# Patient Record
Sex: FEMALE | Race: WHITE | NOT HISPANIC OR LATINO | Employment: FULL TIME | URBAN - NONMETROPOLITAN AREA
[De-identification: names, ages, dates, MRNs, and addresses within clinical notes are randomized per-mention and may not be internally consistent; named-entity substitution may affect disease eponyms.]

---

## 2024-03-12 ENCOUNTER — APPOINTMENT (OUTPATIENT)
Dept: CARDIOLOGY | Facility: HOSPITAL | Age: 49
End: 2024-03-12

## 2024-03-12 ENCOUNTER — HOSPITAL ENCOUNTER (EMERGENCY)
Facility: HOSPITAL | Age: 49
Discharge: OTHER INSTITUTION | End: 2024-03-14
Attending: EMERGENCY MEDICINE

## 2024-03-12 DIAGNOSIS — F32.1 CURRENT MODERATE EPISODE OF MAJOR DEPRESSIVE DISORDER, UNSPECIFIED WHETHER RECURRENT (MULTI): ICD-10-CM

## 2024-03-12 DIAGNOSIS — F22 PARANOID BEHAVIOR (MULTI): Primary | ICD-10-CM

## 2024-03-12 LAB
ALBUMIN SERPL BCP-MCNC: 4.6 G/DL (ref 3.4–5)
ALP SERPL-CCNC: 62 U/L (ref 33–110)
ALT SERPL W P-5'-P-CCNC: 17 U/L (ref 7–45)
AMPHETAMINES UR QL SCN: NORMAL
ANION GAP SERPL CALC-SCNC: 14 MMOL/L (ref 10–20)
APAP SERPL-MCNC: <10 UG/ML
AST SERPL W P-5'-P-CCNC: 21 U/L (ref 9–39)
BARBITURATES UR QL SCN: NORMAL
BASOPHILS # BLD AUTO: 0.08 X10*3/UL (ref 0–0.1)
BASOPHILS NFR BLD AUTO: 0.7 %
BENZODIAZ UR QL SCN: NORMAL
BILIRUB SERPL-MCNC: 0.8 MG/DL (ref 0–1.2)
BUN SERPL-MCNC: 20 MG/DL (ref 6–23)
BZE UR QL SCN: NORMAL
CALCIUM SERPL-MCNC: 9.6 MG/DL (ref 8.6–10.3)
CANNABINOIDS UR QL SCN: NORMAL
CHLORIDE SERPL-SCNC: 103 MMOL/L (ref 98–107)
CO2 SERPL-SCNC: 25 MMOL/L (ref 21–32)
CREAT SERPL-MCNC: 0.8 MG/DL (ref 0.5–1.05)
EGFRCR SERPLBLD CKD-EPI 2021: 90 ML/MIN/1.73M*2
EOSINOPHIL # BLD AUTO: 0.01 X10*3/UL (ref 0–0.7)
EOSINOPHIL NFR BLD AUTO: 0.1 %
ERYTHROCYTE [DISTWIDTH] IN BLOOD BY AUTOMATED COUNT: 12.2 % (ref 11.5–14.5)
ETHANOL SERPL-MCNC: <10 MG/DL
FENTANYL+NORFENTANYL UR QL SCN: NORMAL
GLUCOSE SERPL-MCNC: 147 MG/DL (ref 74–99)
HCT VFR BLD AUTO: 45.4 % (ref 36–46)
HGB BLD-MCNC: 15.1 G/DL (ref 12–16)
HOLD SPECIMEN: NORMAL
IMM GRANULOCYTES # BLD AUTO: 0.03 X10*3/UL (ref 0–0.7)
IMM GRANULOCYTES NFR BLD AUTO: 0.3 % (ref 0–0.9)
LYMPHOCYTES # BLD AUTO: 1.37 X10*3/UL (ref 1.2–4.8)
LYMPHOCYTES NFR BLD AUTO: 11.8 %
MAGNESIUM SERPL-MCNC: 1.96 MG/DL (ref 1.6–2.4)
MCH RBC QN AUTO: 30.8 PG (ref 26–34)
MCHC RBC AUTO-ENTMCNC: 33.3 G/DL (ref 32–36)
MCV RBC AUTO: 93 FL (ref 80–100)
METHADONE UR QL SCN: NORMAL
MONOCYTES # BLD AUTO: 0.94 X10*3/UL (ref 0.1–1)
MONOCYTES NFR BLD AUTO: 8.1 %
NEUTROPHILS # BLD AUTO: 9.15 X10*3/UL (ref 1.2–7.7)
NEUTROPHILS NFR BLD AUTO: 79 %
NRBC BLD-RTO: 0 /100 WBCS (ref 0–0)
OPIATES UR QL SCN: NORMAL
OXYCODONE+OXYMORPHONE UR QL SCN: NORMAL
PCP UR QL SCN: NORMAL
PLATELET # BLD AUTO: 269 X10*3/UL (ref 150–450)
POTASSIUM SERPL-SCNC: 3.6 MMOL/L (ref 3.5–5.3)
PROT SERPL-MCNC: 7.9 G/DL (ref 6.4–8.2)
RBC # BLD AUTO: 4.9 X10*6/UL (ref 4–5.2)
SALICYLATES SERPL-MCNC: <3 MG/DL
SODIUM SERPL-SCNC: 138 MMOL/L (ref 136–145)
TSH SERPL-ACNC: 0.93 MIU/L (ref 0.44–3.98)
WBC # BLD AUTO: 11.6 X10*3/UL (ref 4.4–11.3)

## 2024-03-12 PROCEDURE — 84443 ASSAY THYROID STIM HORMONE: CPT | Performed by: EMERGENCY MEDICINE

## 2024-03-12 PROCEDURE — 80307 DRUG TEST PRSMV CHEM ANLYZR: CPT | Performed by: EMERGENCY MEDICINE

## 2024-03-12 PROCEDURE — 2500000002 HC RX 250 W HCPCS SELF ADMINISTERED DRUGS (ALT 637 FOR MEDICARE OP, ALT 636 FOR OP/ED): Performed by: EMERGENCY MEDICINE

## 2024-03-12 PROCEDURE — 83735 ASSAY OF MAGNESIUM: CPT | Performed by: EMERGENCY MEDICINE

## 2024-03-12 PROCEDURE — 80179 DRUG ASSAY SALICYLATE: CPT | Performed by: EMERGENCY MEDICINE

## 2024-03-12 PROCEDURE — 36415 COLL VENOUS BLD VENIPUNCTURE: CPT | Performed by: EMERGENCY MEDICINE

## 2024-03-12 PROCEDURE — 93005 ELECTROCARDIOGRAM TRACING: CPT

## 2024-03-12 PROCEDURE — 82077 ASSAY SPEC XCP UR&BREATH IA: CPT | Performed by: EMERGENCY MEDICINE

## 2024-03-12 PROCEDURE — 80143 DRUG ASSAY ACETAMINOPHEN: CPT | Performed by: EMERGENCY MEDICINE

## 2024-03-12 PROCEDURE — 85025 COMPLETE CBC W/AUTO DIFF WBC: CPT | Performed by: EMERGENCY MEDICINE

## 2024-03-12 PROCEDURE — 99285 EMERGENCY DEPT VISIT HI MDM: CPT | Mod: 25

## 2024-03-12 PROCEDURE — 80053 COMPREHEN METABOLIC PANEL: CPT | Performed by: EMERGENCY MEDICINE

## 2024-03-12 RX ORDER — QUETIAPINE FUMARATE 25 MG/1
25 TABLET, FILM COATED ORAL ONCE
Status: COMPLETED | OUTPATIENT
Start: 2024-03-12 | End: 2024-03-12

## 2024-03-12 RX ADMIN — QUETIAPINE FUMARATE 25 MG: 25 TABLET ORAL at 23:18

## 2024-03-12 SDOH — HEALTH STABILITY: MENTAL HEALTH: BEHAVIORS/MOOD: DELUSIONS

## 2024-03-12 SDOH — HEALTH STABILITY: MENTAL HEALTH: BEHAVIORS/MOOD: ANXIOUS

## 2024-03-12 SDOH — SOCIAL STABILITY: SOCIAL INSECURITY: FAMILY BEHAVIORS: ANXIOUS

## 2024-03-12 ASSESSMENT — COLUMBIA-SUICIDE SEVERITY RATING SCALE - C-SSRS
1. IN THE PAST MONTH, HAVE YOU WISHED YOU WERE DEAD OR WISHED YOU COULD GO TO SLEEP AND NOT WAKE UP?: NO
2. HAVE YOU ACTUALLY HAD ANY THOUGHTS OF KILLING YOURSELF?: NO
6. HAVE YOU EVER DONE ANYTHING, STARTED TO DO ANYTHING, OR PREPARED TO DO ANYTHING TO END YOUR LIFE?: NO

## 2024-03-12 ASSESSMENT — PAIN SCALES - GENERAL: PAINLEVEL_OUTOF10: 0 - NO PAIN

## 2024-03-12 ASSESSMENT — PAIN - FUNCTIONAL ASSESSMENT: PAIN_FUNCTIONAL_ASSESSMENT: 0-10

## 2024-03-12 NOTE — SIGNIFICANT EVENT
Application for Emergency Admission      Ready for Transfer?  Is the patient medically cleared for transfer to inpatient psychiatry: No  Has the patient been accepted to an inpatient psychiatric hospital: No    Application for Emergency Admission  IN ACCORDANCE WITH [place] 5122.10 [place]  The Chief Clinical Officer of: [facility] 3/12/2024 .7:15 PM    Reason for Hospitalization  The undersigned has reason to believe that: Robert Momin Is a mentally ill person subject to hospitalization by court order under division B Section 5122.01 of the Revised Code, i.e., this person:    1.No  Represents a substantial risk of physical harm to self as manifested by evidence of threats of, or attempts at, suicide or serious self-inflicted bodily harm    2.No Represents a substantial risk of physical harm to others as manifested by evidence of recent homicidal or other violent behavior, evidence of recent threats that place another in reasonable fear of violent behavior and serious physical harm, or other evidence of present dangerousness    3.Yes Represents a substantial and immediate risk of serious physical impairment or injury to self as manifested by  evidence that the person is unable to provide for and is not providing for the person's basic physical needs because of the person's mental illness and that appropriate provision for those needs cannot be made  immediately available in the community    4.Yes Would benefit from treatment in a hospital for his mental illness and is in need of such treatment as manifested by evidence of behavior that creates a grave and imminent risk to substantial rights of others or  himself.    5.Yes Would benefit from treatment as manifested by evidence of behavior that indicates all of the following:       (a) The person is unlikely to survive safely in the community without supervision, based on a clinical determination.       (b) The person has a history of lack of compliance with treatment  for mental illness and one of the following applies:      (i) At least twice within the thirty-six months prior to the filing of an affidavit seeking court-ordered treatment of the person under section 5122.111 of the Revised Code, the lack of compliance has been a significant factor in necessitating hospitalization in a hospital or receipt of services in a forensic or other mental health unit of a correctional facility, provided that the thirty-six-month period shall be extended by the length of any hospitalization or incarceration of the person that occurred within the thirty-six-month period.      (ii) Within the forty-eight months prior to the filing of an affidavit seeking court-ordered treatment of the person under section 5122.111 of the Revised Code, the lack of compliance resulted in one or more acts of serious violent behavior toward self or others or threats of, or attempts at, serious physical harm to self or others, provided that the forty-eight-month period shall be extended by the length of any hospitalization or incarceration of the person that occurred within the forty-eight-month period.      (c) The person, as a result of mental illness, is unlikely to voluntarily participate in necessary treatment.       (d) In view of the person's treatment history and current behavior, the person is in need of treatment in order to prevent a relapse or deterioration that would be likely to result in substantial risk of serious harm to the person or others.    (e) Represents a substantial risk of physical harm to self or others if allowed to remain at liberty pending examination.    Therefore, it is requested that said person be admitted to the above named facility.    STATEMENT OF BELIEF    Must be filled out by one of the following: a psychiatrist, licensed physician, licensed clinical psychologist, health or ,  or .  (Statement shall include the circumstances under which the  individual was taken into custody and the reason for the person's belief that hospitalization is necessary. The statement shall also include a reference to efforts made to secure the individual's property at his residence if he was taken into custody there. Every reasonable and appropriate effort should be made to take this person into custody in the least conspicuous manner possible.)    Toby Ryan MD 3/12/2024     _____________________________________________________________   Place of Employment: [employer]    STATEMENT OF OBSERVATION BY PSYCHIATRIST, LICENSED PHYSICIAN, OR LICENSED CLINICAL PSYCHOLOGIST, IF APPLICABLE    Place of Observation (e.g., [facility] mental health center, general hospital, office, emergency facility)  (If applicable, please complete)    Toby Ryan MD 3/12/2024    _____________________________________________________________

## 2024-03-12 NOTE — SIGNIFICANT EVENT
Application for Emergency Admission      Ready for Transfer?  Is the patient medically cleared for transfer to inpatient psychiatry: No  Has the patient been accepted to an inpatient psychiatric hospital: No    Application for Emergency Admission  IN ACCORDANCE WITH [place] 5122.10 [place]  The Chief Clinical Officer of: 3/12/2024 .7:16 PM    Reason for Hospitalization  The undersigned has reason to believe that: Robert Momin Is a mentally ill person subject to hospitalization by court order under division B Section 5122.01 of the Revised Code, i.e., this person:    1.No  Represents a substantial risk of physical harm to self as manifested by evidence of threats of, or attempts at, suicide or serious self-inflicted bodily harm    2.No Represents a substantial risk of physical harm to others as manifested by evidence of recent homicidal or other violent behavior, evidence of recent threats that place another in reasonable fear of violent behavior and serious physical harm, or other evidence of present dangerousness    3.Yes Represents a substantial and immediate risk of serious physical impairment or injury to self as manifested by  evidence that the person is unable to provide for and is not providing for the person's basic physical needs because of the person's mental illness and that appropriate provision for those needs cannot be made  immediately available in the community    4.Yes Would benefit from treatment in a hospital for his mental illness and is in need of such treatment as manifested by evidence of behavior that creates a grave and imminent risk to substantial rights of others or  himself.    5.Yes Would benefit from treatment as manifested by evidence of behavior that indicates all of the following:       (a) The person is unlikely to survive safely in the community without supervision, based on a clinical determination.       (b) The person has a history of lack of compliance with treatment for  mental illness and one of the following applies:      (i) At least twice within the thirty-six months prior to the filing of an affidavit seeking court-ordered treatment of the person under section 5122.111 of the Revised Code, the lack of compliance has been a significant factor in necessitating hospitalization in a hospital or receipt of services in a forensic or other mental health unit of a correctional facility, provided that the thirty-six-month period shall be extended by the length of any hospitalization or incarceration of the person that occurred within the thirty-six-month period.      (ii) Within the forty-eight months prior to the filing of an affidavit seeking court-ordered treatment of the person under section 5122.111 of the Revised Code, the lack of compliance resulted in one or more acts of serious violent behavior toward self or others or threats of, or attempts at, serious physical harm to self or others, provided that the forty-eight-month period shall be extended by the length of any hospitalization or incarceration of the person that occurred within the forty-eight-month period.      (c) The person, as a result of mental illness, is unlikely to voluntarily participate in necessary treatment.       (d) In view of the person's treatment history and current behavior, the person is in need of treatment in order to prevent a relapse or deterioration that would be likely to result in substantial risk of serious harm to the person or others.    (e) Represents a substantial risk of physical harm to self or others if allowed to remain at liberty pending examination.    Therefore, it is requested that said person be admitted to the above named facility.    STATEMENT OF BELIEF    Must be filled out by one of the following: a psychiatrist, licensed physician, licensed clinical psychologist, health or ,  or .  (Statement shall include the circumstances under which the  individual was taken into custody and the reason for the person's belief that hospitalization is necessary. The statement shall also include a reference to efforts made to secure the individual's property at his residence if he was taken into custody there. Every reasonable and appropriate effort should be made to take this person into custody in the least conspicuous manner possible.)      Toby Ryan MD 3/12/2024     ___________________________[facility]__________________________________   Place of Employment: [facility]    STATEMENT OF OBSERVATION BY PSYCHIATRIST, LICENSED PHYSICIAN, OR LICENSED CLINICAL PSYCHOLOGIST, IF APPLICABLE    Place of Observation (e.g., [facility] mental health center, general hospital, office, emergency facility)  (If applicable, please complete)    Toby Ryan MD 3/12/2024    _____________________________________________________________

## 2024-03-12 NOTE — ED PROVIDER NOTES
HPI   Chief Complaint   Patient presents with    Psychiatric Evaluation     Pt denies SI/HI, states she is having dreams that consume her, dreams of her ex-wife coming back to be with her. Pt states this would be a good thing, but is very tearful and states she is more depressed lately due to recent death in the family       Chief complaint the patient states she has been hearing voices and feeling very paranoid  The patient also states she is depressed  History of present illness this patient states that she feels as if something is drastically wrong with her.  The patient was seen at [facility] last night and again this morning because she wants to be admitted to a healthcare facility for psychiatric stabilization.  This patient lives with her son and the son's girlfriend.  The patient states that she has been extremely depressed and has been hearing voices and having delusions.  The patient states that she thinks the cell phone is creating waves that create movements of people around her that influence how the patient is behaving.  Patient also states that there are cameras checking her from many angles including the cameras on the phones.  The patient also feels as if someone is using her eyeballs to control her movements and thoughts.  She states she is very paranoid and has been having hallucinations hearing the voices of her ex-girlfriend quite frequently.  None of the voices are threatening.  The patient states she is not suicidal or homicidal but she has been accusing her son of trying to hurt her and manipulate her.  The son says the patient is not eating or drinking she has been very agitated wandering through the house saying unusual things that do not make any sense and feels like this patient is very unstable psychiatrically.  This patient states that she has been in a prior relationship with her girlfriend who has had communication via the cell phone and other forms of communication with her and this is  upsetting to the patient however the patient admits that she wants this girlfriend to come back and be back in relationship with her.  The patient does have a history of depression she had been on Zoloft in the past she has not been diagnosed with bipolar disorder or schizophrenia she does have a strong family history of psychiatric problems including bipolar disorder.  The patient states she is hypertensive but not taking any medications.    physical exam:    General: Vitals noted, chills, tearful rapid speech pressured speech admits to hallucinations and paranoid ideation not suicidal or homicidal distress. Afebrile. Alert and oriented  x 4 .  Pupils equal and reactive bilaterally    EENT: TMs clear. Posterior oropharynx unremarkable. No meningismus. No LAD.     Cardiac: Regular, rate, rhythm, no murmurs rubs or gallops.     Pulmonary: Lungs clear bilaterally with good aeration. No adventitious breath sounds. No wheezes rales or rhonchi.     Abdomen: Soft, nonsurgical. Nontender. No peritoneal signs. Normoactive bowel sounds. No pulsatile masses.     Extremities: No peripheral edema. Negative Homans bilaterally, no cords.    Skin: No rash. Intact.     Neuro: No focal neurologic deficits, NIH score of 0. Cranial nerves normal as tested from II through XII.                               Disha Coma Scale Score: 15                     Patient History   Past Medical History:   Diagnosis Date    Depression      History reviewed. No pertinent surgical history.  No family history on file.  Social History     Tobacco Use    Smoking status: Never    Smokeless tobacco: Never   Vaping Use    Vaping Use: Never used   Substance Use Topics    Alcohol use: Not Currently    Drug use: Never       Physical Exam   ED Triage Vitals [03/12/24 1808]   Temp Heart Rate Respirations BP   -- 95 20 (!) 146/91      SpO2 Temp src Heart Rate Source Patient Position   95 % -- -- --      BP Location FiO2 (%)     -- --       Physical Exam    ED  Course & MDM   ED Course as of 03/13/24 1456   Tue Mar 12, 2024   1904 Drug Screen, Urine  Urine drug screen negative [AG]   1907 Signing this patient over to Dr Monsalve [AG]   1937 CBC and Auto Differential(!)  CBC normal [AG]   1937 Urine drug screen negative [AG]   1937 Magnesium  Magnesium  normal [AG]   1937 acetaminophen negligible [AG]   1938 Comprehensive metabolic profile glucose 147 otherwise unremarkable [AG]   1938 Salicylate level  Salicylate negligible [AG]   1938 TSH with reflex to Free T4 if abnormal  Thyroid studies normal [AG]   1938 [facility] will be activated I am signing this patient over to the care of Dr. Monsalve [AG]   2001 Received report from Dr Ryan [MN]   2222 EPIC secure communication form Dr Esquivel of [facility]. Advised to start quetiapine. Patient meets criteria for inpatient treatment [MN]   2250 Patient assessed, chart reviewed. Agreeable to inpatient care [MN]      ED Course User Index  [AG] Toby Ryan MD  [MN] Isis Monsalve MD         Diagnoses as of 03/13/24 1456   Paranoid behavior (CMS/HCC)   Current moderate episode of major depressive disorder, unspecified whether recurrent (CMS/HCC)       Medical Decision Making      Procedure  Procedures     Toby Ryan MD  03/12/24 1955       Toby Ryan MD  03/13/24 1456

## 2024-03-13 PROBLEM — F23 ACUTE PSYCHOSIS (MULTI): Status: ACTIVE | Noted: 2024-03-13

## 2024-03-13 LAB
ATRIAL RATE: 83 BPM
FLUAV RNA RESP QL NAA+PROBE: NOT DETECTED
FLUBV RNA RESP QL NAA+PROBE: NOT DETECTED
HCG UR QL IA.RAPID: NEGATIVE
P AXIS: 58 DEGREES
P OFFSET: 202 MS
P ONSET: 150 MS
PR INTERVAL: 142 MS
Q ONSET: 221 MS
QRS COUNT: 14 BEATS
QRS DURATION: 82 MS
QT INTERVAL: 360 MS
QTC CALCULATION(BAZETT): 423 MS
QTC FREDERICIA: 401 MS
R AXIS: -6 DEGREES
SARS-COV-2 RNA RESP QL NAA+PROBE: NOT DETECTED
T AXIS: 12 DEGREES
T OFFSET: 401 MS
VENTRICULAR RATE: 83 BPM

## 2024-03-13 PROCEDURE — 81025 URINE PREGNANCY TEST: CPT | Performed by: EMERGENCY MEDICINE

## 2024-03-13 PROCEDURE — 2500000002 HC RX 250 W HCPCS SELF ADMINISTERED DRUGS (ALT 637 FOR MEDICARE OP, ALT 636 FOR OP/ED): Performed by: EMERGENCY MEDICINE

## 2024-03-13 PROCEDURE — 87636 SARSCOV2 & INF A&B AMP PRB: CPT | Performed by: EMERGENCY MEDICINE

## 2024-03-13 RX ORDER — QUETIAPINE FUMARATE 25 MG/1
25 TABLET, FILM COATED ORAL NIGHTLY
Status: DISCONTINUED | OUTPATIENT
Start: 2024-03-13 | End: 2024-03-14 | Stop reason: HOSPADM

## 2024-03-13 RX ADMIN — QUETIAPINE FUMARATE 25 MG: 25 TABLET ORAL at 21:03

## 2024-03-13 NOTE — CONSULTS
BEHAVIORAL HEALTH INITIAL CONSULTATION    Referring Provider: Dr. Monsalve    Visit type: Virtual evaluation; consent for telepsychiatric evaluation was obtained from pt    HISTORY OF PRESENT ILLNESS:  Robert Momin is a 48 y.o. female, hx of panic attacks, presenting to ED for worsening delusions and psychosis in the past week.    Per ED provider note:  History of present illness this patient states that she feels as if something is drastically wrong with her. The patient was seen at [facility] last night and again this morning because she wants to be admitted to a healthcare facility for psychiatric stabilization. This patient lives with her son and the son's girlfriend. The patient states that she has been extremely depressed and has been hearing voices and having delusions. The patient states that she thinks the cell phone is creating waves that create movements of people around her that influence how the patient is behaving. Patient also states that there are cameras checking her from many angles including the cameras on the phones. The patient also feels as if someone is using her eyeballs to control her movements and thoughts. She states she is very paranoid and has been having hallucinations hearing the voices of her ex-girlfriend quite frequently. None of the voices are threatening. The patient states she is not suicidal or homicidal but she has been accusing her son of trying to hurt her and manipulate her. The son says the patient is not eating or drinking she has been very agitated wandering through the house saying unusual things that do not make any sense and feels like this patient is very unstable psychiatrically. This patient states that she has been in a prior relationship with her girlfriend who has had communication via the cell phone and other forms of communication with her and this is upsetting to the patient however the patient admits that she wants this girlfriend to come back and be back in  "relationship with her. The patient does have a history of depression she had been on Zoloft in the past she has not been diagnosed with bipolar disorder or schizophrenia she does have a strong family history of psychiatric problems including bipolar disorder.     On interview with pt, she reports she is here today because she has been \"bottling\" emotions up and today reaching a breaking point. Ex-gf \"is my addiction\", and broke up with pt in July 2020; since then pt intermittently develops hope of getting back together again and in those moments feel, \"consumed\" and \"delusional\", for example hearing her voice while knowing she is not there as ex lives in [place]. Recent trigger was pt reaching back out to ex-gf in July 2023. Pt endorses feeling that there are cameras in her home which are controlling her movements. Reporting average 6 hours of sleep in the last 3 nights, barely eating, racing thoughts, auditory hallucinations, increased impulsivity; denies high-risk behaviors or rapid speech. Denies any suicidal or homicidal ideations.     Collateral from son: Son reports pt has not been herself in the last several days (5-6 days ago)  with paranoid delusions (cameras in the house, fam hiding something from pt, \"you guys think you're slick but you're not really slick\"), calling son by ex-wife's name last night. Son reports this is the first time he has witnessed pt experiencing these sxs.     Past Psychiatric History  Current/Previous Diagnoses: Panic d/o, \"Rage\" d/o  Current Psychiatrist/Provider: Denies  Outpatient Treatment History: Well over 30 years since last saw  provider  Current Medications: Denies  Past Medication Trials: Sertraline  Inpatient Hospitalizations: Denies  Suicide Attempts: One in 5th grade pt took some pills and fell asleep  Homicide attempts/Violence: Denies  Self Harm/Self Injurious: Denies  Hx trauma/abuse: Denies    Family Psych History  Father - schizophrenia  Sister - bipolar " d/o  Son - bipolar d/o  Niece - bipolar d/o    Substance Abuse History  She reports that she has never smoked. She has never used smokeless tobacco. She reports that she does not currently use alcohol. She reports that she does not use drugs.  Tobacco use history: Former 1 pack every 3 days; quit 10 years ago  Alcohol use history: Once a month if even   Cannabis use history: Denies  Illicit Drug Use History: Denies  Hx of rehab/complicated withdrawal: Denies  Caffeine: At least 1 cup/day sometimes up to 5 cups/day     Social History  Household: Lives at home with son and son's gf  Occupation: Work at [employer]    Social supports: Son and his gf, older sister, younger sister, and best friend  Hobbies/interests/coping: Coloring, word search/puzzles, write poetry  Legal hx: Denies  Weapons at home and access to lethal means: Denies  Guardian/POA/Payee: Self    OARRS REVIEW  OARRS checked: No data recorded   OARRS comments: Rev 3/12/24 no data    Past Medical/Surgical History  HTN  High cholesterol  Stroke in 2004 w/ residual deficits L-side weakness  GERD  Pre-DM  PCOS  Refer to primary note for more comprehensive details.    ALLERGIES  Patient has no known allergies.    PSYCHIATRIC REVIEW OF SYSTEMS  Depression: depressed mood, sleep disturbance: difficulty falling asleep and frequent awakening, tearfulness, and changes in appetite or weight leading to significant weight loss or gain unintentionally   Anxiety: excessive worry that is difficult to control, restlessness or feeling keyed up or on edge, irritability, sleep disturbance, and panic attacks: episodes of palpitations, tachycardia, chest pain/tightness, trembling/shaking, shortness of breath , and fear of dying or going crazy  Jeanne: expansive or irritable mood , increased goal-directed activity , inflated self-esteem or grandiosity , decreased need for sleep, talkativeness or pressured speech, flight of ideas or racing thoughts, and increased  "distractibility  Psychosis: auditory hallucinations:ex gf voice positive content, paranoia, and delusions: persecutory, grandiosity, and erotomania or love  Delirium: negative   Trauma: negative    OBJECTIVE    VITALS      3/12/2024     6:08 PM   Vitals   Systolic 146   Diastolic 91   Heart Rate 95   Resp 20   Height (in) 1.702 m (5' 7\")   Weight (lb) 180   BMI 28.19 kg/m2   BSA (m2) 1.96 m2      Body mass index is 28.19 kg/m².  Facility age limit for growth %holly is 20 years.  Wt Readings from Last 4 Encounters:   03/12/24 81.6 kg (180 lb)     Mental Status Exam  General: Tearful, distressed 48yoF seated in ED bed  Appearance: Appeared as age stated; dyed purple hair, glasses, slightly disheveled  Attitude: Anxious/mildly agitated but cooperative  Behavior: Fair EC; overall responding appropriately  Motor Activity: No notable mauri PMAR  Speech: Moments of more rapid speech but overall not pressured, coherent, normal volume  Mood: \"Out of it\"  Affect: Labile mood  Thought Process: Circumstantial  Thought Content: Denies SI/HI  Thought Perception: Endorses paranoia, delusions, and auditory hallucinations; denies visual hallucinations; not responding to internal stimuli  Cognition: Intact grossly  Insight: Fair  Judgement: Impaired    HOME MEDICATIONS  Medication Documentation Review Audit    **Prior to Admission medications have not yet been reviewed**          CURRENT MEDICATIONS  Scheduled medications    Continuous medications    PRN medications    LABS  Admission on 03/12/2024   Component Date Value Ref Range Status    WBC 03/12/2024 11.6 (H)  4.4 - 11.3 x10*3/uL Final    nRBC 03/12/2024 0.0  0.0 - 0.0 /100 WBCs Final    RBC 03/12/2024 4.90  4.00 - 5.20 x10*6/uL Final    Hemoglobin 03/12/2024 15.1  12.0 - 16.0 g/dL Final    Hematocrit 03/12/2024 45.4  36.0 - 46.0 % Final    MCV 03/12/2024 93  80 - 100 fL Final    MCH 03/12/2024 30.8  26.0 - 34.0 pg Final    MCHC 03/12/2024 33.3  32.0 - 36.0 g/dL Final    RDW " 03/12/2024 12.2  11.5 - 14.5 % Final    Platelets 03/12/2024 269  150 - 450 x10*3/uL Final    Neutrophils % 03/12/2024 79.0  40.0 - 80.0 % Final    Immature Granulocytes %, Automated 03/12/2024 0.3  0.0 - 0.9 % Final    Immature Granulocyte Count (IG) includes promyelocytes, myelocytes and metamyelocytes but does not include bands. Percent differential counts (%) should be interpreted in the context of the absolute cell counts (cells/UL).    Lymphocytes % 03/12/2024 11.8  13.0 - 44.0 % Final    Monocytes % 03/12/2024 8.1  2.0 - 10.0 % Final    Eosinophils % 03/12/2024 0.1  0.0 - 6.0 % Final    Basophils % 03/12/2024 0.7  0.0 - 2.0 % Final    Neutrophils Absolute 03/12/2024 9.15 (H)  1.20 - 7.70 x10*3/uL Final    Percent differential counts (%) should be interpreted in the context of the absolute cell counts (cells/uL).    Immature Granulocytes Absolute, Au* 03/12/2024 0.03  0.00 - 0.70 x10*3/uL Final    Lymphocytes Absolute 03/12/2024 1.37  1.20 - 4.80 x10*3/uL Final    Monocytes Absolute 03/12/2024 0.94  0.10 - 1.00 x10*3/uL Final    Eosinophils Absolute 03/12/2024 0.01  0.00 - 0.70 x10*3/uL Final    Basophils Absolute 03/12/2024 0.08  0.00 - 0.10 x10*3/uL Final    Magnesium 03/12/2024 1.96  1.60 - 2.40 mg/dL Final    Glucose 03/12/2024 147 (H)  74 - 99 mg/dL Final    Sodium 03/12/2024 138  136 - 145 mmol/L Final    Potassium 03/12/2024 3.6  3.5 - 5.3 mmol/L Final    Chloride 03/12/2024 103  98 - 107 mmol/L Final    Bicarbonate 03/12/2024 25  21 - 32 mmol/L Final    Anion Gap 03/12/2024 14  10 - 20 mmol/L Final    Urea Nitrogen 03/12/2024 20  6 - 23 mg/dL Final    Creatinine 03/12/2024 0.80  0.50 - 1.05 mg/dL Final    eGFR 03/12/2024 90  >60 mL/min/1.73m*2 Final    Calculations of estimated GFR are performed using the 2021 CKD-EPI Study Refit equation without the race variable for the IDMS-Traceable creatinine methods.  https://jasn.asnjournals.org/content/early/2021/09/22/ASN.8560679214    Calcium 03/12/2024 9.6   8.6 - 10.3 mg/dL Final    Albumin 03/12/2024 4.6  3.4 - 5.0 g/dL Final    Alkaline Phosphatase 03/12/2024 62  33 - 110 U/L Final    Total Protein 03/12/2024 7.9  6.4 - 8.2 g/dL Final    AST 03/12/2024 21  9 - 39 U/L Final    Bilirubin, Total 03/12/2024 0.8  0.0 - 1.2 mg/dL Final    ALT 03/12/2024 17  7 - 45 U/L Final    Patients treated with Sulfasalazine may generate falsely decreased results for ALT.    Amphetamine Screen, Urine 03/12/2024 Presumptive Negative  Presumptive Negative Final    CUTOFF LEVEL: 500 NG/ML   Cross-reactivity has been reported with high concentrations   of the following drugs: buproprion, chloroquine, chlorpromazine,   ephedrine, mephentermine, fenfluramine, phentermine,   phenylpropanolamine, pseudoephedrine, and propranolol.    Barbiturate Screen, Urine 03/12/2024 Presumptive Negative  Presumptive Negative Final    CUTOFF LEVEL: 200 NG/ML    Benzodiazepines Screen, Urine 03/12/2024 Presumptive Negative  Presumptive Negative Final    CUTOFF LEVEL: 200 NG/ML    Cannabinoid Screen, Urine 03/12/2024 Presumptive Negative  Presumptive Negative Final    CUTOFF LEVEL: 50 NG/ML    Cocaine Metabolite Screen, Urine 03/12/2024 Presumptive Negative  Presumptive Negative Final    CUTOFF LEVEL: 150 NG/ML    Fentanyl Screen, Urine 03/12/2024 Presumptive Negative  Presumptive Negative Final    CUTOFF LEVEL: 5 NG/ML    Opiate Screen, Urine 03/12/2024 Presumptive Negative  Presumptive Negative Final    CUTOFF LEVEL: 300 NG/ML  The opiate screen does not detect fentanyl, meperidine, or   tramadol. Oxycodone is not consistently detected (refer to  Oxycodone Screen, Urine result).    Oxycodone Screen, Urine 03/12/2024 Presumptive Negative  Presumptive Negative Final    CUTOFF LEVEL: 100 NG/ML  This test will accurately detect both oxycodone and oxymorphone.    PCP Screen, Urine 03/12/2024 Presumptive Negative  Presumptive Negative Final    CUTOFF LEVEL:  25 NG/ML  Cross-reactivity has been reported with  dextromethorphan.    Methadone Screen, Urine 03/12/2024 Presumptive Negative  Presumptive Negative Final    CUTOFF LEVEL: 150 NG/ML  The metabolite L-alpha-acetylmethadol (LAAM) is not  detected by this method in concentrations that would  be found in the urine of patients on LAAM therapy.    Salicylate  03/12/2024 <3  4 - 20 mg/dL Final    Alcohol 03/12/2024 <10  <=10 mg/dL Final    For medical use only.    Acetaminophen 03/12/2024 <10.0  10.0 - 30.0 ug/mL Final    Thyroid Stimulating Hormone 03/12/2024 0.93  0.44 - 3.98 mIU/L Final    Extra Tube 03/12/2024 Hold for add-ons.   Final    Auto resulted.    Extra Tube 03/12/2024 Hold for add-ons.   Final    Auto resulted.    Extra Tube 03/12/2024 Hold for add-ons.   Final    Auto resulted.     PSYCHIATRIC RISK ASSESSMENT  Violence Risk Factors:  persecutory delusions  Acute Risk of Harm to Others is Considered: Moderate to High  Suicide Risk Factors: ; /[place] native, sexual minority, prior suicide attempts , recent loss or impending loss of loved one, failed relationship the last year, life crisis (shame/despair), severe anxiety, akathisia, or panic, anxious ruminations, and lack of treatment access, discontinuities in treatment, or recent discharge from hospital  Protective Factors: fear of suicide or death, sense of responsibility towards family, social support/connectedness, positive family relationships, hopefulness/future-orientation, and employment  Acute Risk of Harm to Self is Considered: High    ASSESSMENT AND PLAN  Robert Momin is a 48 y.o. female, hx of panic attacks, presenting to ED for worsening delusions and psychosis in the past week. On initial assessment, pt is anxious, with labile mood, endorsing worsening paranoia, delusions, auditory hallucinations, decreased sleep, racing thoughts, increased goal-directed activities, and some grandiosity over the past several days; son corroborated these symptoms on collateral and  "notes this is first time pt has had this presentation. No suicidal ideations. There is family history of schizophrenia in pt's father, and bipolar d/o in several other fam members. CT head was neg for acute findings, Utox also neg, and medical work-up largely unremarkable thus far. Overall, pt's presentation represents dramatic change from baseline c/f unspecified psychosis and would benefit from inpt psychiatric hospitalization for further eval and treatment.   Recommendations detailed below.     EKG - none recently    IMPRESSION  Unspecified psychosis  Panic d/o by hx    RECOMMENDATIONS  - Patient does currently meet criteria for inpatient psychiatric admission. Once patient is deemed medically cleared, please document clearly in note that patient is MEDICALLY CLEARED and contact [facility] for referral at [phone], pager [phone]. Issue Application for Emergency Admission (pink slip) only after patient is accepted to an inpatient psychiatric unit and is ready to be discharged. Search “Application for Emergency Admission” under SmartText.”  - Patient lacks the capacity to leave AMA at this time and thus cannot leave AMA. Call [facility] if patient attempts to leave AMA.  - To evaluate decision-making capacity, recommend use of the Capacity Evaluation Tool. Search “ IP Capacity Evaluation under SmartText\" unless the patient has a legal guardian, in which case all decisions per the legal guardian.  - Patient does require a 1:1 sitter from a psychiatric perspective at this time.  - As with all hospitalized patients, would recommend delirium precautions, as below.   -- Minimize use of benzos, opiates, anticholinergics, as these may worsen mental status   -- Would use caution with narcotic pain medications   -- Would still adequately controlling pain, as uncontrolled pain is also a risk factor for delirium   -- Reinforce sleep hygiene; encourage patient to stay awake during the day   -- Keep curtains/blinds open during the day " and closed at night.   -- Would recommend reorienting/redirecting patient as much as possible,    -- Aim for consistent staffing, familiar objects, avoiding bright lights and loud noises, etc.     Work-up:  - Utox negative, TSH wnl  - CT Head 3/12 no acute findings  - Consider B12, folate, RPR, HIV  - Please obtain EKG for Qtc monitoring    Medications:  - Pending Qtc < 475ms, initiate quetiapine 25mg PO tonight     Follow-up:  - Pt has no current outpatient, but was connected recently to [facility] (not yet had intake appt)  ==========  - Discussed recommendations with ED provider.    Patient staffed/discussed with Dr. Bui, who agrees with above plan.    Tiffanie Esquivel MD  PGY-3 Adult Psychiatry  On behalf of the Adult Psychiatry EPAT Service; tel. [phone]

## 2024-03-13 NOTE — SIGNIFICANT EVENT
Application for Emergency Admission      Ready for Transfer?  Is the patient medically cleared for transfer to inpatient psychiatry: Yes  Has the patient been accepted to an inpatient psychiatric hospital: Yes    Application for Emergency Admission  IN ACCORDANCE WITH [place] 5122[place]10 [place]  The Chief Clinical Officer of:  Dr. Ewing 3/13/2024 .3:44 PM    Reason for Hospitalization  The undersigned has reason to believe that: Robert Momin Is a mentally ill person subject to hospitalization by court order under division B Section 5122.01 of the Revised Code, i.e., this person:    1.Yes  Represents a substantial risk of physical harm to self as manifested by evidence of threats of, or attempts at, suicide or serious self-inflicted bodily harm    2.No Represents a substantial risk of physical harm to others as manifested by evidence of recent homicidal or other violent behavior, evidence of recent threats that place another in reasonable fear of violent behavior and serious physical harm, or other evidence of present dangerousness    3.Yes Represents a substantial and immediate risk of serious physical impairment or injury to self as manifested by  evidence that the person is unable to provide for and is not providing for the person's basic physical needs because of the person's mental illness and that appropriate provision for those needs cannot be made  immediately available in the community    4.Yes Would benefit from treatment in a hospital for his mental illness and is in need of such treatment as manifested by evidence of behavior that creates a grave and imminent risk to substantial rights of others or  himself.    5.Yes Would benefit from treatment as manifested by evidence of behavior that indicates all of the following:       (a) The person is unlikely to survive safely in the community without supervision, based on a clinical determination.       (b) The person has a history of lack of compliance with  treatment for mental illness and one of the following applies:      (i) At least twice within the thirty-six months prior to the filing of an affidavit seeking court-ordered treatment of the person under section 5122.111 of the Revised Code, the lack of compliance has been a significant factor in necessitating hospitalization in a hospital or receipt of services in a forensic or other mental health unit of a correctional facility, provided that the thirty-six-month period shall be extended by the length of any hospitalization or incarceration of the person that occurred within the thirty-six-month period.      (ii) Within the forty-eight months prior to the filing of an affidavit seeking court-ordered treatment of the person under section 5122.111 of the Revised Code, the lack of compliance resulted in one or more acts of serious violent behavior toward self or others or threats of, or attempts at, serious physical harm to self or others, provided that the forty-eight-month period shall be extended by the length of any hospitalization or incarceration of the person that occurred within the forty-eight-month period.      (c) The person, as a result of mental illness, is unlikely to voluntarily participate in necessary treatment.       (d) In view of the person's treatment history and current behavior, the person is in need of treatment in order to prevent a relapse or deterioration that would be likely to result in substantial risk of serious harm to the person or others.    (e) Represents a substantial risk of physical harm to self or others if allowed to remain at liberty pending examination.    Therefore, it is requested that said person be admitted to the above named facility.    STATEMENT OF BELIEF    Must be filled out by one of the following: a psychiatrist, licensed physician, licensed clinical psychologist, health or ,  or .  (Statement shall include the circumstances under  which the individual was taken into custody and the reason for the person's belief that hospitalization is necessary. The statement shall also include a reference to efforts made to secure the individual's property at his residence if he was taken into custody there. Every reasonable and appropriate effort should be made to take this person into custody in the least conspicuous manner possible.)         Sania Kaur MD 3/13/2024   ER attending physician, [facility]  _____________________________________________________________   Place of Employment:     STATEMENT OF OBSERVATION BY PSYCHIATRIST, LICENSED PHYSICIAN, OR LICENSED CLINICAL PSYCHOLOGIST, IF APPLICABLE    Place of Observation (e.g., [facility], general hospital, office, emergency facility)  (If applicable, please complete)    Sania Kaur MD 3/13/2024    _____________________________________________________________

## 2024-03-13 NOTE — PROGRESS NOTES
Robert Momin is a 48 y.o. female on day 0 of admission presenting with depression and delusional thinking. She was medically cleared by Dr Ryan. Please see his not for full details of the H&P. Patient was signed out to me pending EPAT consultation.    Subjective   2250 --Denies any needs at this time. Eating fast food with family       Objective     Physical Exam  Vitals reviewed.   Constitutional:       Appearance: Normal appearance.   HENT:      Head: Normocephalic.   Cardiovascular:      Rate and Rhythm: Normal rate.   Pulmonary:      Effort: Pulmonary effort is normal.   Skin:     General: Skin is dry.   Neurological:      General: No focal deficit present.      Mental Status: She is alert.   Psychiatric:         Mood and Affect: Mood normal.         Behavior: Behavior normal.     EK --twelve-lead EKG was obtained and read by me. This demonstrates normal sinus rhythm with a rate of 83, normal intervals, normal axes, no ectopy, no ischemia, no pericarditis. There was no  prior EKG.    Labs Reviewed   CBC WITH AUTO DIFFERENTIAL - Abnormal       Result Value    WBC 11.6 (*)     nRBC 0.0      RBC 4.90      Hemoglobin 15.1      Hematocrit 45.4      MCV 93      MCH 30.8      MCHC 33.3      RDW 12.2      Platelets 269      Neutrophils % 79.0      Immature Granulocytes %, Automated 0.3      Lymphocytes % 11.8      Monocytes % 8.1      Eosinophils % 0.1      Basophils % 0.7      Neutrophils Absolute 9.15 (*)     Immature Granulocytes Absolute, Automated 0.03      Lymphocytes Absolute 1.37      Monocytes Absolute 0.94      Eosinophils Absolute 0.01      Basophils Absolute 0.08     COMPREHENSIVE METABOLIC PANEL - Abnormal    Glucose 147 (*)     Sodium 138      Potassium 3.6      Chloride 103      Bicarbonate 25      Anion Gap 14      Urea Nitrogen 20      Creatinine 0.80      eGFR 90      Calcium 9.6      Albumin 4.6      Alkaline Phosphatase 62      Total Protein 7.9      AST 21      Bilirubin, Total 0.8       ALT 17     MAGNESIUM - Normal    Magnesium 1.96     DRUG SCREEN,URINE - Normal    Amphetamine Screen, Urine Presumptive Negative      Barbiturate Screen, Urine Presumptive Negative      Benzodiazepines Screen, Urine Presumptive Negative      Cannabinoid Screen, Urine Presumptive Negative      Cocaine Metabolite Screen, Urine Presumptive Negative      Fentanyl Screen, Urine Presumptive Negative      Opiate Screen, Urine Presumptive Negative      Oxycodone Screen, Urine Presumptive Negative      PCP Screen, Urine Presumptive Negative      Methadone Screen, Urine Presumptive Negative      Narrative:     Drug screen results are presumptive and should not be used to assess   compliance with prescribed medication. Contact the performing [facility] laboratory   to add-on definitive confirmatory testing if clinically indicated.    Toxicology screening results are reported qualitatively. The concentration must   be greater than or equal to the cutoff to be reported as positive. The concentration   at which the screening test can detect an individual drug or metabolite varies.   The absence of expected drug(s) and/or drug metabolite(s) may indicate non-compliance,   inappropriate timing of specimen collection relative to drug administration, poor drug   absorption, diluted/adulterated urine, or limitations of testing. For medical purposes   only; not valid for forensic use.    Interpretive questions should be directed to the laboratory medical directors.   SALICYLATE - Normal    Salicylate  <3     ALCOHOL - Normal    Alcohol <10     ACETAMINOPHEN - Normal    Acetaminophen <10.0     TSH WITH REFLEX TO FREE T4 IF ABNORMAL - Normal    Thyroid Stimulating Hormone 0.93      Narrative:     TSH testing is performed using different testing methodology at [facility] than at other [facility]. Direct result comparisons should only be made within the same method.     GRAY TOP    Extra Tube Hold for add-ons.       ED  "Medication Administration from 03/12/2024 1756 to 03/12/2024 0953         Date/Time Order Dose Route Action Action by     03/12/2024 5149 EDT QUEtiapine (SEROquel) tablet 25 mg 25 mg oral Given Rice, C              Last Recorded Vitals  Blood pressure (!) 146/91, pulse 95, resp. rate 20, height 1.702 m (5' 7\"), weight 81.6 kg (180 lb), SpO2 95 %.  Intake/Output last 3 Shifts:  No intake/output data recorded.    Relevant Results                 Assessment/Plan   Active Problems:    Current moderate episode of major depressive disorder (CMS/HCC)    This patient presented to the emergency department with feelings of depression and some delusional thinking.  No suicidal or homicidal ideation.  No evidence of acute life-threatening medical illness or injury that would prohibit appropriate psychiatric care.    Patient was signed out to me pending EPAT consultation.  Patient was seen via telemetry consult with Dr. Esquivel of the EPAT.  She advises that the patient does meet inpatient criteria for treatment.  She recommends starting quetiapine tonight, as long as no Qtc prolongation on EKG. Qtc was 423 here; therefore, fist dose ordered.    Patient is agreeable with the plan for inpatient treatment and to start the medications tonight.  We will continue the patient under safe supervision pending appropriate facility placement and transportation.       I spent 0 minutes in the critical care of this patient.    Diagnosis    Current moderate episode of major depressive disorder (CMS/HCC)    Isis Monsalve MD      "

## 2024-03-13 NOTE — PROGRESS NOTES
This patient has been seen by multiple providers and signed out to me and subsequently has been accepted by EPAT for admission to [facility] psychiatric unit by Dr. Ewing.  She has been hemodynamically stable with no chest pain or short of breath required no intervention during my.  Of her care in the ER infection eating require any care except for continuation of medication and her meals.  She was cooperative and remained clinically stable subsequently and being transferred to [facility] psychiatric unit as    .  Patient agreed with the transfer.                 Assessment/Plan   Active Problems:    Current moderate episode of major depressive disorder (CMS/HCC)           I spent 5 minutes in the professional and overall care of this patient.      Sania Kaur MD

## 2024-03-14 ENCOUNTER — HOSPITAL ENCOUNTER (INPATIENT)
Facility: HOSPITAL | Age: 49
LOS: 8 days | Discharge: HOME | End: 2024-03-22
Attending: PSYCHIATRY & NEUROLOGY | Admitting: PSYCHIATRY & NEUROLOGY

## 2024-03-14 VITALS
WEIGHT: 180 LBS | OXYGEN SATURATION: 95 % | SYSTOLIC BLOOD PRESSURE: 148 MMHG | RESPIRATION RATE: 20 BRPM | TEMPERATURE: 98.6 F | BODY MASS INDEX: 28.25 KG/M2 | HEART RATE: 78 BPM | HEIGHT: 67 IN | DIASTOLIC BLOOD PRESSURE: 93 MMHG

## 2024-03-14 DIAGNOSIS — F31.2 BIPOLAR AFFECTIVE DISORDER, CURRENTLY MANIC, SEVERE, WITH PSYCHOTIC FEATURES (MULTI): Primary | ICD-10-CM

## 2024-03-14 DIAGNOSIS — F41.8 OTHER SPECIFIED ANXIETY DISORDERS: ICD-10-CM

## 2024-03-14 PROCEDURE — 1240000001 HC SEMI-PRIVATE BH ROOM DAILY

## 2024-03-14 PROCEDURE — 97150 GROUP THERAPEUTIC PROCEDURES: CPT | Mod: GO,CO

## 2024-03-14 PROCEDURE — 2500000002 HC RX 250 W HCPCS SELF ADMINISTERED DRUGS (ALT 637 FOR MEDICARE OP, ALT 636 FOR OP/ED): Performed by: PSYCHIATRY & NEUROLOGY

## 2024-03-14 PROCEDURE — 97165 OT EVAL LOW COMPLEX 30 MIN: CPT | Mod: GO

## 2024-03-14 PROCEDURE — 99223 1ST HOSP IP/OBS HIGH 75: CPT | Performed by: PSYCHIATRY & NEUROLOGY

## 2024-03-14 RX ORDER — LORAZEPAM 2 MG/ML
2 INJECTION INTRAMUSCULAR EVERY 6 HOURS PRN
Status: DISCONTINUED | OUTPATIENT
Start: 2024-03-14 | End: 2024-03-22 | Stop reason: HOSPADM

## 2024-03-14 RX ORDER — HYDROXYZINE PAMOATE 50 MG/1
50 CAPSULE ORAL EVERY 4 HOURS PRN
Status: DISCONTINUED | OUTPATIENT
Start: 2024-03-14 | End: 2024-03-22 | Stop reason: HOSPADM

## 2024-03-14 RX ORDER — DIPHENHYDRAMINE HYDROCHLORIDE 50 MG/ML
50 INJECTION INTRAMUSCULAR; INTRAVENOUS ONCE AS NEEDED
Status: DISCONTINUED | OUTPATIENT
Start: 2024-03-14 | End: 2024-03-22 | Stop reason: HOSPADM

## 2024-03-14 RX ORDER — ACETAMINOPHEN 500 MG
5 TABLET ORAL NIGHTLY PRN
Status: DISCONTINUED | OUTPATIENT
Start: 2024-03-14 | End: 2024-03-22 | Stop reason: HOSPADM

## 2024-03-14 RX ORDER — DIPHENHYDRAMINE HCL 50 MG
50 CAPSULE ORAL EVERY 6 HOURS PRN
Status: DISCONTINUED | OUTPATIENT
Start: 2024-03-14 | End: 2024-03-22 | Stop reason: HOSPADM

## 2024-03-14 RX ORDER — ALUMINUM HYDROXIDE, MAGNESIUM HYDROXIDE, AND SIMETHICONE 1200; 120; 1200 MG/30ML; MG/30ML; MG/30ML
30 SUSPENSION ORAL EVERY 6 HOURS PRN
Status: DISCONTINUED | OUTPATIENT
Start: 2024-03-14 | End: 2024-03-22 | Stop reason: HOSPADM

## 2024-03-14 RX ORDER — HALOPERIDOL 5 MG/ML
10 INJECTION INTRAMUSCULAR EVERY 6 HOURS PRN
Status: DISCONTINUED | OUTPATIENT
Start: 2024-03-14 | End: 2024-03-22 | Stop reason: HOSPADM

## 2024-03-14 RX ORDER — LORAZEPAM 1 MG/1
2 TABLET ORAL EVERY 6 HOURS PRN
Status: DISCONTINUED | OUTPATIENT
Start: 2024-03-14 | End: 2024-03-22 | Stop reason: HOSPADM

## 2024-03-14 RX ORDER — ACETAMINOPHEN 325 MG/1
650 TABLET ORAL EVERY 4 HOURS PRN
Status: DISCONTINUED | OUTPATIENT
Start: 2024-03-14 | End: 2024-03-22 | Stop reason: HOSPADM

## 2024-03-14 RX ORDER — HALOPERIDOL 5 MG/1
10 TABLET ORAL EVERY 6 HOURS PRN
Status: DISCONTINUED | OUTPATIENT
Start: 2024-03-14 | End: 2024-03-22 | Stop reason: HOSPADM

## 2024-03-14 RX ADMIN — QUETIAPINE FUMARATE 75 MG: 50 TABLET ORAL at 20:58

## 2024-03-14 SDOH — HEALTH STABILITY: PHYSICAL HEALTH: ON AVERAGE, HOW MANY MINUTES DO YOU ENGAGE IN EXERCISE AT THIS LEVEL?: 0 MIN

## 2024-03-14 SDOH — SOCIAL STABILITY: SOCIAL INSECURITY: ABUSE: ADULT

## 2024-03-14 SDOH — ECONOMIC STABILITY: FOOD INSECURITY: WITHIN THE PAST 12 MONTHS, THE FOOD YOU BOUGHT JUST DIDN'T LAST AND YOU DIDN'T HAVE MONEY TO GET MORE.: SOMETIMES TRUE

## 2024-03-14 SDOH — SOCIAL STABILITY: SOCIAL INSECURITY: WERE YOU ABLE TO COMPLETE ALL THE BEHAVIORAL HEALTH SCREENINGS?: YES

## 2024-03-14 SDOH — ECONOMIC STABILITY: INCOME INSECURITY: IN THE PAST 12 MONTHS, HAS THE ELECTRIC, GAS, OIL, OR WATER COMPANY THREATENED TO SHUT OFF SERVICE IN YOUR HOME?: NO

## 2024-03-14 SDOH — HEALTH STABILITY: MENTAL HEALTH
EXPERIENCED ANY OF THE FOLLOWING LIFE EVENTS: SOCIAL LOSS (BANKRUPTCY, DIVORCE, WORK-RELATED STRESS);PHYSICAL ASSAULT;DEATH OF FAMILY/FRIEND

## 2024-03-14 SDOH — SOCIAL STABILITY: SOCIAL INSECURITY
WITHIN THE LAST YEAR, HAVE TO BEEN RAPED OR FORCED TO HAVE ANY KIND OF SEXUAL ACTIVITY BY YOUR PARTNER OR EX-PARTNER?: NO

## 2024-03-14 SDOH — ECONOMIC STABILITY: HOUSING INSECURITY: IN THE LAST 12 MONTHS, HOW MANY PLACES HAVE YOU LIVED?: 2

## 2024-03-14 SDOH — ECONOMIC STABILITY: INCOME INSECURITY: IN THE LAST 12 MONTHS, WAS THERE A TIME WHEN YOU WERE NOT ABLE TO PAY THE MORTGAGE OR RENT ON TIME?: YES

## 2024-03-14 SDOH — SOCIAL STABILITY: SOCIAL INSECURITY: DO YOU FEEL UNSAFE GOING BACK TO THE PLACE WHERE YOU ARE LIVING?: NO

## 2024-03-14 SDOH — SOCIAL STABILITY: SOCIAL INSECURITY: WITHIN THE LAST YEAR, HAVE YOU BEEN AFRAID OF YOUR PARTNER OR EX-PARTNER?: NO

## 2024-03-14 SDOH — SOCIAL STABILITY: SOCIAL NETWORK
DO YOU BELONG TO ANY CLUBS OR ORGANIZATIONS SUCH AS CHURCH GROUPS UNIONS, FRATERNAL OR ATHLETIC GROUPS, OR SCHOOL GROUPS?: NO

## 2024-03-14 SDOH — ECONOMIC STABILITY: INCOME INSECURITY: HOW HARD IS IT FOR YOU TO PAY FOR THE VERY BASICS LIKE FOOD, HOUSING, MEDICAL CARE, AND HEATING?: VERY HARD

## 2024-03-14 SDOH — ECONOMIC STABILITY: HOUSING INSECURITY
IN THE LAST 12 MONTHS, WAS THERE A TIME WHEN YOU DID NOT HAVE A STEADY PLACE TO SLEEP OR SLEPT IN A SHELTER (INCLUDING NOW)?: NO

## 2024-03-14 SDOH — SOCIAL STABILITY: SOCIAL INSECURITY: DOES ANYONE TRY TO KEEP YOU FROM HAVING/CONTACTING OTHER FRIENDS OR DOING THINGS OUTSIDE YOUR HOME?: NO

## 2024-03-14 SDOH — ECONOMIC STABILITY: TRANSPORTATION INSECURITY
IN THE PAST 12 MONTHS, HAS THE LACK OF TRANSPORTATION KEPT YOU FROM MEDICAL APPOINTMENTS OR FROM GETTING MEDICATIONS?: YES

## 2024-03-14 SDOH — SOCIAL STABILITY: SOCIAL INSECURITY: WITHIN THE LAST YEAR, HAVE YOU BEEN HUMILIATED OR EMOTIONALLY ABUSED IN OTHER WAYS BY YOUR PARTNER OR EX-PARTNER?: NO

## 2024-03-14 SDOH — SOCIAL STABILITY: SOCIAL INSECURITY: ARE YOU OR HAVE YOU BEEN THREATENED OR ABUSED PHYSICALLY, EMOTIONALLY, OR SEXUALLY BY ANYONE?: YES

## 2024-03-14 SDOH — SOCIAL STABILITY: SOCIAL INSECURITY: HAS ANYONE EVER THREATENED TO HURT YOUR FAMILY OR YOUR PETS?: NO

## 2024-03-14 SDOH — ECONOMIC STABILITY: FOOD INSECURITY: WITHIN THE PAST 12 MONTHS, YOU WORRIED THAT YOUR FOOD WOULD RUN OUT BEFORE YOU GOT MONEY TO BUY MORE.: SOMETIMES TRUE

## 2024-03-14 SDOH — SOCIAL STABILITY: SOCIAL NETWORK: ARE YOU MARRIED, WIDOWED, DIVORCED, SEPARATED, NEVER MARRIED, OR LIVING WITH A PARTNER?: SEPARATED

## 2024-03-14 SDOH — HEALTH STABILITY: MENTAL HEALTH
STRESS IS WHEN SOMEONE FEELS TENSE, NERVOUS, ANXIOUS, OR CAN'T SLEEP AT NIGHT BECAUSE THEIR MIND IS TROUBLED. HOW STRESSED ARE YOU?: TO SOME EXTENT

## 2024-03-14 SDOH — SOCIAL STABILITY: SOCIAL INSECURITY: HAVE YOU HAD THOUGHTS OF HARMING ANYONE ELSE?: NO

## 2024-03-14 SDOH — SOCIAL STABILITY: SOCIAL INSECURITY: ARE THERE ANY APPARENT SIGNS OF INJURIES/BEHAVIORS THAT COULD BE RELATED TO ABUSE/NEGLECT?: NO

## 2024-03-14 SDOH — SOCIAL STABILITY: SOCIAL NETWORK: HOW OFTEN DO YOU ATTEND CHURCH OR RELIGIOUS SERVICES?: PATIENT DECLINED

## 2024-03-14 SDOH — SOCIAL STABILITY: SOCIAL NETWORK: VISITOR BEHAVIORS: CALM

## 2024-03-14 SDOH — HEALTH STABILITY: PHYSICAL HEALTH: ON AVERAGE, HOW MANY DAYS PER WEEK DO YOU ENGAGE IN MODERATE TO STRENUOUS EXERCISE (LIKE A BRISK WALK)?: 0 DAYS

## 2024-03-14 SDOH — SOCIAL STABILITY: SOCIAL NETWORK: HOW OFTEN DO YOU GET TOGETHER WITH FRIENDS OR RELATIVES?: ONCE A WEEK

## 2024-03-14 SDOH — SOCIAL STABILITY: SOCIAL INSECURITY: ARE YOU OR HAVE YOU BEEN THREATENED OR ABUSED PHYSICALLY, EMOTIONALLY, OR SEXUALLY BY ANYONE?: NO

## 2024-03-14 SDOH — SOCIAL STABILITY: SOCIAL NETWORK: HOW OFTEN DO YOU ATTENT MEETINGS OF THE CLUB OR ORGANIZATION YOU BELONG TO?: NEVER

## 2024-03-14 SDOH — SOCIAL STABILITY: SOCIAL INSECURITY: DO YOU FEEL ANYONE HAS EXPLOITED OR TAKEN ADVANTAGE OF YOU FINANCIALLY OR OF YOUR PERSONAL PROPERTY?: NO

## 2024-03-14 SDOH — SOCIAL STABILITY: SOCIAL NETWORK: IN A TYPICAL WEEK, HOW MANY TIMES DO YOU TALK ON THE PHONE WITH FAMILY, FRIENDS, OR NEIGHBORS?: THREE TIMES A WEEK

## 2024-03-14 SDOH — ECONOMIC STABILITY: TRANSPORTATION INSECURITY
IN THE PAST 12 MONTHS, HAS LACK OF TRANSPORTATION KEPT YOU FROM MEETINGS, WORK, OR FROM GETTING THINGS NEEDED FOR DAILY LIVING?: YES

## 2024-03-14 SDOH — HEALTH STABILITY: MENTAL HEALTH: BEHAVIORS/MOOD: CALM;COOPERATIVE

## 2024-03-14 ASSESSMENT — PAIN SCALES - GENERAL
PAINLEVEL_OUTOF10: 0 - NO PAIN

## 2024-03-14 ASSESSMENT — LIFESTYLE VARIABLES
VISUAL DISTURBANCES: NOT PRESENT
HOW MANY STANDARD DRINKS CONTAINING ALCOHOL DO YOU HAVE ON A TYPICAL DAY: 1 OR 2
SKIP TO QUESTIONS 9-10: 1
HOW OFTEN DO YOU HAVE A DRINK CONTAINING ALCOHOL: MONTHLY OR LESS
AUDIT-C TOTAL SCORE: 2
HOW OFTEN DO YOU HAVE A DRINK CONTAINING ALCOHOL: MONTHLY OR LESS
PULSE: 83
AGITATION: NORMAL ACTIVITY
AUDIT-C TOTAL SCORE: 1
SKIP TO QUESTIONS 9-10: 0
AUDIT-C TOTAL SCORE: 1
AUDITORY DISTURBANCES: NOT PRESENT
AUDIT-C TOTAL SCORE: 2
PAROXYSMAL SWEATS: NO SWEAT VISIBLE
ANXIETY: NO ANXIETY, AT EASE
HOW OFTEN DO YOU HAVE 6 OR MORE DRINKS ON ONE OCCASION: NEVER
SUBSTANCE_ABUSE_PAST_12_MONTHS: NO
CIWA TOTAL SCORE: 0
BLOOD PRESSURE: 125/88
TOTAL_SCORE: 1
PRESCIPTION_ABUSE_PAST_12_MONTHS: NO
HEADACHE, FULLNESS IN HEAD: NOT PRESENT
TREMOR: NO TREMOR
HOW OFTEN DO YOU HAVE 6 OR MORE DRINKS ON ONE OCCASION: LESS THAN MONTHLY
HOW MANY STANDARD DRINKS CONTAINING ALCOHOL DO YOU HAVE ON A TYPICAL DAY: 1 OR 2
SUBSTANCE_ABUSE_PAST_12_MONTHS: NO
NAUSEA AND VOMITING: NO NAUSEA AND NO VOMITING
ORIENTATION AND CLOUDING OF SENSORIUM: ORIENTED AND CAN DO SERIAL ADDITIONS
PRESCIPTION_ABUSE_PAST_12_MONTHS: NO

## 2024-03-14 ASSESSMENT — COLUMBIA-SUICIDE SEVERITY RATING SCALE - C-SSRS
2. HAVE YOU ACTUALLY HAD ANY THOUGHTS OF KILLING YOURSELF?: NO
6. HAVE YOU EVER DONE ANYTHING, STARTED TO DO ANYTHING, OR PREPARED TO DO ANYTHING TO END YOUR LIFE?: NO
1. SINCE LAST CONTACT, HAVE YOU WISHED YOU WERE DEAD OR WISHED YOU COULD GO TO SLEEP AND NOT WAKE UP?: NO

## 2024-03-14 ASSESSMENT — PAIN - FUNCTIONAL ASSESSMENT
PAIN_FUNCTIONAL_ASSESSMENT: 0-10

## 2024-03-14 ASSESSMENT — ENCOUNTER SYMPTOMS
SLEEP DISTURBANCE: 1
NEUROLOGICAL NEGATIVE: 1
ENDOCRINE NEGATIVE: 1
GASTROINTESTINAL NEGATIVE: 1
HALLUCINATIONS: 1
MUSCULOSKELETAL NEGATIVE: 1
RESPIRATORY NEGATIVE: 1
CONSTITUTIONAL NEGATIVE: 1
EYES NEGATIVE: 1
NERVOUS/ANXIOUS: 1
CARDIOVASCULAR NEGATIVE: 1

## 2024-03-14 ASSESSMENT — ACTIVITIES OF DAILY LIVING (ADL)
BATHING: INDEPENDENT
WALKS IN HOME: INDEPENDENT
HEARING - RIGHT EAR: FUNCTIONAL
FEEDING YOURSELF: INDEPENDENT
GROOMING: INDEPENDENT
JUDGMENT_ADEQUATE_SAFELY_COMPLETE_DAILY_ACTIVITIES: YES
TOILETING: INDEPENDENT
DRESSING YOURSELF: INDEPENDENT
ADEQUATE_TO_COMPLETE_ADL: YES
HEARING - LEFT EAR: FUNCTIONAL
PATIENT'S MEMORY ADEQUATE TO SAFELY COMPLETE DAILY ACTIVITIES?: YES

## 2024-03-14 ASSESSMENT — PATIENT HEALTH QUESTIONNAIRE - PHQ9
1. LITTLE INTEREST OR PLEASURE IN DOING THINGS: SEVERAL DAYS
SUM OF ALL RESPONSES TO PHQ9 QUESTIONS 1 & 2: 2
2. FEELING DOWN, DEPRESSED OR HOPELESS: SEVERAL DAYS

## 2024-03-14 NOTE — SIGNIFICANT EVENT
Application for Emergency Admission      Ready for Transfer?  Is the patient medically cleared for transfer to inpatient psychiatry: Yes  Has the patient been accepted to an inpatient psychiatric hospital: Yes    Application for Emergency Admission  IN ACCORDANCE [place]  The Chief Clinical Officer of: Dr. Ewing, [facility] emergency psychiatric facility 3/14/2024 .3:29 AM    Reason for Hospitalization  The undersigned has reason to believe that: Robert Momin Is a mentally ill person subject to hospitalization by court order under division B Section 5122.01 of the Revised Code, i.e., this person:    1.Yes  Represents a substantial risk of physical harm to self as manifested by evidence of threats of, or attempts at, suicide or serious self-inflicted bodily harm    2.No Represents a substantial risk of physical harm to others as manifested by evidence of recent homicidal or other violent behavior, evidence of recent threats that place another in reasonable fear of violent behavior and serious physical harm, or other evidence of present dangerousness    3.Yes Represents a substantial and immediate risk of serious physical impairment or injury to self as manifested by  evidence that the person is unable to provide for and is not providing for the person's basic physical needs because of the person's mental illness and that appropriate provision for those needs cannot be made  immediately available in the community    4.Yes Would benefit from treatment in a hospital for his mental illness and is in need of such treatment as manifested by evidence of behavior that creates a grave and imminent risk to substantial rights of others or  himself.    5.Yes Would benefit from treatment as manifested by evidence of behavior that indicates all of the following:       (a) The person is unlikely to survive safely in the community without supervision, based on a clinical determination.        (b) The person has a history of lack of compliance with treatment for mental illness and one of the following applies:      (i) At least twice within the thirty-six months prior to the filing of an affidavit seeking court-ordered treatment of the person under section 5122.111 of the Revised Code, the lack of compliance has been a significant factor in necessitating hospitalization in a hospital or receipt of services in a forensic or other mental health unit of a correctional facility, provided that the thirty-six-month period shall be extended by the length of any hospitalization or incarceration of the person that occurred within the thirty-six-month period.      (ii) Within the forty-eight months prior to the filing of an affidavit seeking court-ordered treatment of the person under section 5122.111 of the Revised Code, the lack of compliance resulted in one or more acts of serious violent behavior toward self or others or threats of, or attempts at, serious physical harm to self or others, provided that the forty-eight-month period shall be extended by the length of any hospitalization or incarceration of the person that occurred within the forty-eight-month period.      (c) The person, as a result of mental illness, is unlikely to voluntarily participate in necessary treatment.       (d) In view of the person's treatment history and current behavior, the person is in need of treatment in order to prevent a relapse or deterioration that would be likely to result in substantial risk of serious harm to the person or others.    (e) Represents a substantial risk of physical harm to self or others if allowed to remain at liberty pending examination.    Therefore, it is requested that said person be admitted to the above named facility.    STATEMENT OF BELIEF    Must be filled out by one of the following: a psychiatrist, licensed physician, licensed clinical psychologist, health or ,  or  deputy ortiz.  (Statement shall include the circumstances under which the individual was taken into custody and the reason for the person's belief that hospitalization is necessary. The statement shall also include a reference to efforts made to secure the individual's property at his residence if he was taken into custody there. Every reasonable and appropriate effort should be made to take this person into custody in the least conspicuous manner possible.)         Sania Kaur MD 3/14/2024   [facility]   ER attending.  _____________________________________________________________   Place of Employment:     STATEMENT OF OBSERVATION BY PSYCHIATRIST, LICENSED PHYSICIAN, OR LICENSED CLINICAL PSYCHOLOGIST, IF APPLICABLE      Sania Kaur MD 3/14/2024    _____________________________________________________________

## 2024-03-14 NOTE — CARE PLAN
Care plan was completed today. Patient and CTRS discussed possible treatment goals. Patient will be encouraged to attend a variety of unit programming.

## 2024-03-14 NOTE — NURSING NOTE
Patient admitted to the unit from [facility] for psychosis.  Rates anxieety 1, denies depression, suicidal ideations, and hallucinations.  States coping skills of deep breathing and listening to music.  Her strengths are that she is strong and kind.  Patient's reasoning for admission was that she needed help and her goal is to get her life back.  She currently lives with her son and his girl friend and states there are financial issues.  No PRN medications were administered.  Patient denies any medications taken at home due to lack of insurance coverage.  Dr. Villegas informed of patient arrival.  Patient requested to shower.

## 2024-03-14 NOTE — PROGRESS NOTES
" REHAB Therapy Assessment & Treatment    Patient Name: Robert Momin  MRN: [id number]  Today's Date: 3/14/2024    Activity Assessment:  Initial Assessment  Attention Span: 15-30 Miutes  Cognitive Behavior Status/Orientation: Person, Place, Time, Attentive, Delusional/paranoid, Capable  Crisis Triggers: Control, Emotions, Mood, Other (Comment) (discussing relationship issues with  partner)  Emotional Concerns/Mood/Affect: Alert, Cooperative, Friendly, Sad/tearful, Other (Comment) (worried (about life and how others perceive her))  Hearing: Adequate  Memory: Memory intact  Motivation Level: Minimum encouragement needed  Negative Coping Skills: Denial (of paranoid thinking)  Speech/Communication/Socialization: Verbal, Responds when approached, Initiatives conversattion with others, Appropriate interation  Vision: Adequate    Leisure Survey:  [facility] Leisure Interest Survey  Activity Preference: Group, Independent  Activity Tolerance: Good 30-60 minutes  At Home ADL Deficits: Paying Bills (Patient expressed worries/fear about being able to manage her home (primary care taker) and pay bills.)  Barriers to Leisure Participation: Emotions, Mood/affect, Lack of motivation  Community Resources: Aware of resources however does not utilize resources  Creative Activities: Adult coloring books  Education/School: unknown  Following Directions: Able to follow multi-step commands  Leisure Interests: Not active with identified interests, Needs to expand leisure interests  Living Arrangement: Relative, Other (Comment) (Home with young/adult son)  Motivators for Recreation/Leisure Involvement: Relaxation, Fun/entertainment  Outdoor Activities: Trips/traveling (\"I want to go to [place] with my wife\")  Passive Games: Board games, Other (Comment) ((past))  Patient Strengths:  (\"I'm a strong and kind person\" and \"friendly\")  Physial Activity: Other (Comment) (walking)  Social/Group Activities: Other (Comment) (\"spending time " "with family\" and \"amusement butler\")  Solitary Activities: Watch/listen television, Computer activities, Word puzzles, Music, Sing listening, Family oriented  Transportation: Family/friend, Other (Comment) (Patient reported that her son drives her.)  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 Ms. Momin has a history of panic attacks, delusional thoughts, psychosis, and a depressed mood. She was willing to fully complete the RT assessment and answered all questions asked. Patient described a worsening mood after a separation with her wife and multiple other close “losses” in the past year. Patient described having a “13 year relationship” with her partner which she also raised a son with. Prior to admission she experienced a “dream” or premonition that her “wife and she would get back together”. Patient is worried that “people won’t believe her” about these premonitions and that they “think she’s crazy”. Patient was tearful during most of the 1:1 engagement. She had a difficult time redirecting onto other topics and frequently brought it back to her “ex-wife”. Patient doesn’t live an active leisure lifestyle during this period of time since the separation. She stated that she just wants her “dream to be real” and “someone to believe her”. She wasn’t able to make any specific treatment goals at this time. Patient will be encouraged to attend a variety of unit programming. She was offered leisure interests/activities and was encouraged to ask if she needs anything.    Therapeutic Recreation:     Encounter Problems       Encounter Problems (Active)       Distress Tolerance  RT       To learn ways to manage stress       Start:  03/14/24    Expected End:  03/21/24               Emotional  RT       Mood       Start:  03/14/24    Expected End:  03/21/24               Recreation  RT       Leisure education       Start:  03/14/24    " Expected End:  03/21/24

## 2024-03-14 NOTE — CARE PLAN
"The patient's goals for the shift include \"Get cleaned up\"    The clinical goals for the shift include [place] to the unit    Over the shift, the patient did not make progress toward the following goals. Barriers to progression include acuity of illness. Recommendations to address these barriers include medication compliance and education.    Problem: Alteration in Sleep  Goal: STG - Identifies sleep hygiene aids  Outcome: Not Progressing     "

## 2024-03-14 NOTE — GROUP NOTE
"Group Topic: Leisure Skills   Group Date: 3/14/2024  Start Time: 1600  End Time: 1700  Facilitators: EVERETT MillerS   Department: [facility] REHAB THERAPY VIRTUAL    Number of Participants: 6   Group Focus: concentration, leisure skills, and social skills  Treatment Modality: Other: Recreation Therapy  Interventions utilized were leisure development, mental fitness, and problem solving  Purpose: insight or knowledgecognitive stimulation, enhance mood, social stimulation, increase attention span, leisure awareness    Name: Robert Momin YOB: 1975   MR: [id number]      Facilitator: Recreational Therapist  Level of Participation: did not attend  Progress: None  Comments: In this Rec. Therapy group session patients engaged in a leisure awareness activity providing mental stimulation and increased socialization. Patients played the game \"Catch Phrase\". This activity involved providing clues to guess the answer of words or phrases in specified categories. Patient's took turns either guessing or providing clues. All participants were provided directions, encouragement, and assistance if needed.   Pt declines invitation to attend group. Sleeping in room. No handout available this group.   Plan: continue with services      "

## 2024-03-14 NOTE — NURSING NOTE
"Patient out on the unit and social with peers this shift. Rated anxiety 0/10 and depression 0/10. Denied SI/HI. Denied auditory/visual/other hallucinations. No complaints of pain or discomfort. Patient has attended group therapy this shift. Medication compliant. Able to state positive coping skills such as \"color, listen to music, and word searches\". Patient has been tearful at times this shift while talking on the phone. Patient does not have insurance, patients family reached out and inquired about getting some forms signed for them. Q 15 minute checks to be maintained throughout shift for safety.    "
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
## 2024-03-14 NOTE — PROGRESS NOTES
"Occupational Therapy     REHAB Therapy Assessment & Treatment    Patient Name: Robert Momin  MRN: [id number]  Today's Date: 3/14/2024      Activity Assessment:     Mindfulness and Yoga/Stretching Group: [id number]  [id number] Coping and Safe Place Group: [id number]  Pet Therapy as a Coping Tool Group: [id number]    3/3 Groups attended     Following OT eval and in collaboration with the OTR/L, pt attends and participates well in all above groups. Pt is pleasant, cooperative, and eager to engage in group. Pt murali's EARL understanding of stretching task/education as well as murali's appropriate follow through with same during group. Pt able to recognize \"feeling more relaxed\" after the breathing/stretching exercise and shares \"will use it again for sure\" Pt continues to murali ELLIOTT understanding of [id number] education and is able to share her safe place for future stress would be \"sitting on the beach just relaxing\" Pt able to appropriately describe using her senses in her safe place to increase awareness of how mindfulness can improve mood. Pt with G progress toward OT goals this date as evidenced above. Pt would benefit from continued OT services in order to improve overall self-esteem, personal confidence and supports with increased awareness for safe transition location at discharge.        Encounter Problems       Encounter Problems (Active)       OT Goals       Pt will ID 3 strategies to manage symptoms of grief to improve daily function and QoL       Start:  03/14/24    Expected End:  04/04/24            Pt will improve ability to ID and express emotions while accepting and tolerating all emotions, in order to increase awareness of positive emotions.        Start:  03/14/24    Expected End:  04/04/24            Pt will show increased reality based behavior in OT groups or 1:1 interactions prior to discharge 100% of the time.        Start:  03/14/24    Expected End:  04/04/24            Pt will explore and ID 1-2 strategies to " manage stressors/symptoms of illness/ grief more effectively prior to discharge       Start:  03/14/24    Expected End:  04/04/24            Pt will ID 2 community resources/programs to join/attend after D/C to improve their support system       Start:  03/14/24    Expected End:  04/04/24                     Additional Comments:    BRAVO collaborated with patients nurse and charge nurse throughout the day to provide the appropriate support and encouragement to attend groups. Pt up on unit when BRAVO left last group of the day. All needs met.

## 2024-03-14 NOTE — PROGRESS NOTES
"Occupational Therapy     REHAB Therapy Assessment & Treatment    Patient Name: Robert Momin  MRN: [id number]  Today's Date: 3/14/2024  Time In/Out: 7980-5202    Activity Assessment:  Initial Assessment  Emotional Concerns/Mood/Affect: Friendly, Labile    Performance and Participation in Areas of Occupation:   Activities of Daily Living/Self Care:  · Medication Use- was not taking anything for mental health PTA, is skeptical to start new regime  · Physical Self-care- pt appears poorly groomed upon evaluation, pt reports no issues with self care at home. Pt unable to detail aspects of daily routine and becomes tangential about irrelevant topic    Instrumental Activities of Daily Living:  · Parenting/- is a mother with her ex    Education and Employment:  · Current Employment- currently working at [email or web address], states she really enjoys her job, likes her boss, and has no issues maintaining day-to-day work routine    Leisure/Play:  · Leisure Interests- when asked, pt became tangential and discussed previous dreams/vacation plans with her ex, also states she enjoys coloring and music    Social Participation/Community Involvement:  · Socializing- pt states she \"has been consumed by her ex leaving\" and says she has not been the same since. States she has difficulty trusting others.  · Balanced Relationships- pt with limited supportive relationships, lists her son and her ex as her main supports. Pt also states\"I'm tired of my family calling me crazy\"  · Social/Community Comment- would benefit from exploration of community resources, unable to identify      Performance in Meaningful Roles:  · Life Roles/Goals- values her role as a mother, reports \"my son is my life\"  · Sense of Purposefulness- pt reports finding extreme difficulty managing the loss of her relationship with her ex, often perseverating on past memories rather than answering relevant questions.    Emotional Regulation Skills:  · Emotional Expression- " "pt became tearful when discussing family, reason for admittance. Also expresses increased frustration when stating the staff took her stuffed animal away. A goal for current LOS is \"to get that stuffed animal back. It reminds me of my ex. It's like a piece of her is in that bear\".  · Mood Modulation- several mood changes noted throughout, from elated and happy to sad and labile.  · Anxiety Management/Depression Management- pt avoidant of identifying stressors, is unable to comment on how she currently angela with difficult situations  · Grief- pt with severe difficulty recovering from the loss of her relationship with ex    Cognitive Skills and Task Performance Skills:  · Concentration- moderately impaired  · Attention Span-  moderately impaired   · Perseveration- difficulty with redirecting pt away from conversations about ex  · Delusion/Hallucination- states she \"can basically manifest things that are going to happen\". Does not admit to /    Communication and Interpersonal Skills:  · Communication/Interpersonal Comment- pt with fluctuation mood, inconsistent tone but remained friendly with polite affect. Asked/answered questions appropriately.     Client Factors:  · Realistic Expectations- goal for current LOS is \"to make me not feel crazy\" and \"to get my stuffed bear back\"  · Internal Choice/Control- does not appear internally stimulated  · Coping Skills- maximally impaired  · Grief Management- would benefit from methods to explore loss of relationship, difficulty with role changes  · Insight- maximally impaired   · Motivation- appears help seeking, is understanding of need for help and is willing to participate in OT interventions      Encounter Problems       Encounter Problems (Active)       OT Goals       Pt will ID 3 strategies to manage symptoms of grief to improve daily function and QoL       Start:  03/14/24    Expected End:  04/04/24            Pt will improve ability to ID and express emotions while " accepting and tolerating all emotions, in order to increase awareness of positive emotions.        Start:  03/14/24    Expected End:  04/04/24            Pt will show increased reality based behavior in OT groups or 1:1 interactions prior to discharge 100% of the time.        Start:  03/14/24    Expected End:  04/04/24            Pt will explore and ID 1-2 strategies to manage stressors/symptoms of illness/ grief more effectively prior to discharge       Start:  03/14/24    Expected End:  04/04/24            Pt will ID 2 community resources/programs to join/attend after D/C to improve their support system       Start:  03/14/24    Expected End:  04/04/24                     Education Documentation  No documentation found.  Education Comments  No comments found.          Additional Comments:

## 2024-03-14 NOTE — CARE PLAN
"The patient's goals for the shift include \"I want everything to be how I want it to be\"    The clinical goals for the shift include maintain patient safety    Over the shift, the patient did make progress toward the following goals.     Problem: Sensory Perceptual Alteration as Evidenced by  Goal: Cooperates with admission process  Outcome: Progressing     Problem: Sensory Perceptual Alteration as Evidenced by  Goal: Participates in unit activities  Outcome: Progressing     Problem: Sensory Perceptual Alteration as Evidenced by  Goal: Will not act on psychotic perception  Outcome: Progressing     Problem: Ineffective Coping  Goal: Identifies healthy coping skills  Outcome: Progressing     Problem: Ineffective Coping  Goal: Demonstrates healthy coping skills  Outcome: Progressing     Problem: Self Care Deficit  Goal: Accepts need for medications  Outcome: Progressing     Problem: Risk for Suicide  Goal: Identifies supports this shift  Outcome: Progressing     Problem: Risk for Suicide  Goal: No self harm this shift  Outcome: Progressing       "

## 2024-03-14 NOTE — GROUP NOTE
Group Topic: Symptom Management   Group Date: 3/14/2024  Start Time: 1400  End Time: 2533  Facilitators: MICHELET Miller; MICHELET Rodriguez   Department: [facility] REHAB THERAPY VIRTUAL    Number of Participants: 8   Group Focus: illness education and recovery concepts  Treatment Modality: Recreation Therapy  Interventions utilized were clarification, problem solving, and support  Purpose: coping skills, insight or knowledge, and other: understand concepts of recovery    Name: Robert Momin YOB: 1975   MR: [id number]      Facilitator: Recreational Therapist  Level of Participation: did not attend  Progress: Minimal  Comments: Participants were provided an activity that focused on “concepts of recovery”. This session included cards with ideas like attitude, physical health, negative thoughts, motivation, daily routine, communication, and many more were handed to each patient. Then the group processed each concept by sharing how they relate, understand, and why that word/phrase is important to a recovery process. Participants were provided an opportunity to share personal thoughts and encouraged to support one another. The group members were also provided a handout which encouraged them to make a “30 day commitment”. This handout provided some example coping skills to use throughout the month and left some days blank for the patients to fill in.    Patient remained in her room throughout the session.     Plan: continue with services

## 2024-03-15 PROCEDURE — 2500000002 HC RX 250 W HCPCS SELF ADMINISTERED DRUGS (ALT 637 FOR MEDICARE OP, ALT 636 FOR OP/ED): Performed by: PSYCHIATRY & NEUROLOGY

## 2024-03-15 PROCEDURE — 1240000001 HC SEMI-PRIVATE BH ROOM DAILY

## 2024-03-15 PROCEDURE — 99232 SBSQ HOSP IP/OBS MODERATE 35: CPT | Performed by: PSYCHIATRY & NEUROLOGY

## 2024-03-15 PROCEDURE — 97150 GROUP THERAPEUTIC PROCEDURES: CPT | Mod: GO,CO

## 2024-03-15 RX ADMIN — QUETIAPINE FUMARATE 150 MG: 100 TABLET ORAL at 20:48

## 2024-03-15 ASSESSMENT — PAIN SCALES - GENERAL
PAINLEVEL_OUTOF10: 0 - NO PAIN
PAINLEVEL_OUTOF10: 0 - NO PAIN

## 2024-03-15 ASSESSMENT — PAIN - FUNCTIONAL ASSESSMENT
PAIN_FUNCTIONAL_ASSESSMENT: 0-10
PAIN_FUNCTIONAL_ASSESSMENT: 0-10
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
## 2024-03-15 NOTE — NURSING NOTE
Pt is very pleasant  She took her night time medications  talked on the phone for a bit and went to bed  Pt had an uneventful night
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
## 2024-03-15 NOTE — GROUP NOTE
Group Topic: Leisure Skills   Group Date: 3/15/2024  Start Time: 1400  End Time: 1500  Facilitators: EVERETT MillerS   Department: [facility] REHAB THERAPY VIRTUAL    Number of Participants: 9   Group Focus: concentration, leisure skills, and social skills  Treatment Modality: Other: Recreation Therapy  Interventions utilized were exploration and leisure development  Purpose: other: cognitive focus, leisure awareness, increase socialization, enhance mood,     Name: Robert Momin YOB: 1975   MR: [id number]      Facilitator: Recreational Therapist  Level of Participation: active  Quality of Participation: appropriate/pleasant, attentive, cooperative, and engaged  Interactions with others: appropriate  Mood/Affect: appropriate and bright  Triggers (if applicable): n/a  Cognition: coherent/clear  Progress: Moderate  Comments: Patients were gathered to participate in the game Apples to Apples, which is a game utilizing one's own perception. This activity works on cognitive skills, following directions, positive social interaction and promotes leisure awareness.   Plan: continue with services

## 2024-03-15 NOTE — PROGRESS NOTES
"Robert Momin is a 48 y.o. year old female patient who is on [facility] admission day 1.      Subjective   Robert Momin is a 48 y.o. year old female patient who was personally seen and interviewed, and discussed in morning team rounds. The patient was interviewed alone in her room (interviewed sitting on bed), and was easily engaged and cooperative. This morning, Robert reports feeling \"anxious\" and currently rates her depression at a 0 out of 10. No suicidal ideation or plans were elicited. She also rates her anxiety at a 8 out of 10. She is anxious because she wants to know what is going on and she feels that no one is listening to her. Robert expressed concern about receiving two pills lat night and was reassured that it was part of the increased dose of Seroquel. She denies GI symptoms and other side effects. Robert endorsed AH, including continuing to hear her ex's voice, and paranoia and delusions of reference, including believing that the TV's are communicating with her. She also endorses paranoia that everyone is following her and reporting on her. She reports sleeping well last night. Nursing reports that Robert slept 8.75 hours last night (unbroken).           Objective   Mental Status Exam:   General: Appropriately groomed and dressed in hospital attire.   Appearance: Appears stated age.   Attitude: Anxious, cooperative.   Behavior: Appropriate eye contact.   Motor Activity: No agitation or retardation. No EPS/TD.  Normal gait and station. Normal muscle tone and bulk.   Speech: Regular rate, rhythm, volume and tone, spontaneous,  fluent. Non-pressured.   Mood: \"anxious\"   Affect: Labile   Thought Process: Organized, linear, goal directed.   Thought Content: Does not endorse suicidal ideation or any suicide plans.   Does not endorse homicidal ideation.  Delusions of reference and paranoia elicited.    Thought Perception: Endorses auditory hallucinations, does not appear to be responding to hallucinatory " stimuli.   Cognition: Alert, oriented x 3. No deficits noted.  Adequate fund of knowledge. No deficit in recent and remote memory. No deficits in attention, concentration or language.   Insight: Poor, as patient does not recognize symptoms of  illness and need for recommended treatments.    Judgment:Poor as patient cannot make reasonable decisions about ordinary activities of daily living and necessary medical care recommendations.       LABS:  No results found for this or any previous visit (from the past 24 hour(s)).     Last Recorded Vitals  Visit Vitals  /80 (BP Location: Left arm, Patient Position: Standing)   Pulse (!) 119   Temp 36.6 °C (97.9 °F) (Temporal)   Resp 18        Intake/Output last 3 Shifts:  No intake/output data recorded.    Relevant Results  Scheduled medications  QUEtiapine, 75 mg, oral, Nightly      Continuous medications     PRN medications  PRN medications: acetaminophen, alum-mag hydroxide-simeth, diphenhydrAMINE **OR** diphenhydrAMINE, haloperidol **OR** haloperidol lactate, hydrOXYzine pamoate, LORazepam **OR** LORazepam, melatonin, psyllium               Assessment/Plan   1) Bipolar Disorder, type I, currently manic, severe with psychosis (AH, paranoia)       Plan: *1) Increase Seroquel 75 mg --> 150mg at bedtime starting 3/15/24                2) Group and Milieu therapy     2) Panic Disorder without Agoraphobia, with moderate panic attacks       Plan: 1) See above     3) Other specified anxiety       Plan: 1) See above     4) Hypertension       Plan: 1) IM service to follow and manage      5) HLD       Plan: 1) IM service to follow and manage      6) GERD       Plan: 1) IM service to follow and manage      7) Pre-DM       Plan: 1) IM service to follow and manage    LEVY Bright-Student

## 2024-03-15 NOTE — PROGRESS NOTES
Occupational Therapy     REHAB Therapy Assessment & Treatment    Patient Name: Robert Momin  MRN: [id number]  Today's Date: 3/15/2024      Activity Assessment:      Creative Art and Relaxation Group: [id number]  Meaningful Sharing/ Self Esteem Group: [id number]  Problem Solving/ Leisure Skills Group: [id number]    3/3 Groups attended     Pt present and engages well in all above groups with continued appropriate mood, G attention to task, and no paranoid behaviors observed. Pt eager to engage in art activity with G completion as well as able to share with improved confidence during 2nd half of AM groups. Pt with appropriate social interaction with peers as well as able to ID using creative art as a possible future coping skill. During leisure activity, pt remains appropriately attentive, assists peers in times of need and continued to demo appropriate emotion regulation/attention to task. Pt with improvement toward OT goals as evidenced above. Pt would benefit from continued OT services in order to improve overall self-esteem, personal confidence and supports with increased awareness for safe transition location at discharge.      Encounter Problems       Encounter Problems (Active)       OT Goals       Pt will ID 3 strategies to manage symptoms of grief to improve daily function and QoL (Progressing)       Start:  03/14/24    Expected End:  04/04/24            Pt will improve ability to ID and express emotions while accepting and tolerating all emotions, in order to increase awareness of positive emotions.  (Progressing)       Start:  03/14/24    Expected End:  04/04/24            Pt will show increased reality based behavior in OT groups or 1:1 interactions prior to discharge 100% of the time.  (Progressing)       Start:  03/14/24    Expected End:  04/04/24            Pt will explore and ID 1-2 strategies to manage stressors/symptoms of illness/ grief more effectively prior to discharge (Progressing)       Start:   03/14/24    Expected End:  04/04/24            Pt will ID 2 community resources/programs to join/attend after D/C to improve their support system (Progressing)       Start:  03/14/24    Expected End:  04/04/24                         Additional Comments:    BRAVO collaborated with patients nurse and charge nurse throughout the day to provide the appropriate support and encouragement to attend groups. Pt up on unit when BRAVO left last group of the day. All needs met.

## 2024-03-15 NOTE — NURSING NOTE
"Pt interview in the front [facility]  pt is calm and cooperative  Pt is working on coloring a picture  Pt stated her goal \"get sleep\"  her strength \"I show love to people\"  and her coping skill \"I listen to music\"  Pt denied everything at this time Pt is appropriate with her answers to the questions at this time    "

## 2024-03-15 NOTE — CARE PLAN
"Discussed in Treatment team today;  Stabilizing;  States still hallucinating, that TV is \"talking to her today\", still hearing ex's voice. Still adjusting medications;  sw to follow.   "

## 2024-03-15 NOTE — CARE PLAN
"The patient's goals for the shift include \"I want real food, it tastes disgusting\"    The clinical goals for the shift include maintain patient safety    Over the shift, the patient did make progress toward the following goals.     Problem: Sensory Perceptual Alteration as Evidenced by  Goal: Participates in unit activities  Outcome: Progressing     Problem: Sensory Perceptual Alteration as Evidenced by  Goal: Initiates reality-based interactions  Outcome: Progressing     Problem: Sensory Perceptual Alteration as Evidenced by  Goal: Will not act on psychotic perception  Outcome: Progressing     Problem: Ineffective Coping  Goal: Identifies healthy coping skills  Outcome: Progressing     Problem: Ineffective Coping  Goal: Demonstrates healthy coping skills  Outcome: Progressing     Problem: Self Care Deficit  Goal: Accepts need for medications  Outcome: Progressing     Problem: Risk for Suicide  Goal: Accepts medications as prescribed/needed this shift  Outcome: Progressing       "

## 2024-03-15 NOTE — CARE PLAN
"The patient's goals for the shift include \"get sleep\"    The clinical goals for the shift include medication compliance    Over the shift, the patient did not make progress toward the following goals. Barriers to progression include lack of knowledge on medications. Recommendations to address these barriers include more education on medications .    "

## 2024-03-15 NOTE — NURSING NOTE
"Patient out on the unit and social with peers this shift. Rated anxiety 0/10 and depression 4-5/10. Denied SI/HI. Denied visual/other hallucinations. Pt endorses auditory hallucinations, Patient states she can hear her ex talking to her. Patient hears the voice saying \"get ready for the future\". Patient also states that \"the television is definitely talking to me today\". No complaints of pain or discomfort. Patient has attended and participated in group therapy sessions this shift. Medication compliant. Able to state positive coping skills such as \"listen to music, color, and word searches\". Patient has been pleasant and cooperative with staff this shift. No PRN medications given or requested this shift. Q 15 minute checks to be maintained throughout shift for safety.    "

## 2024-03-16 PROCEDURE — 2500000002 HC RX 250 W HCPCS SELF ADMINISTERED DRUGS (ALT 637 FOR MEDICARE OP, ALT 636 FOR OP/ED): Performed by: PSYCHIATRY & NEUROLOGY

## 2024-03-16 PROCEDURE — 99233 SBSQ HOSP IP/OBS HIGH 50: CPT | Performed by: PSYCHIATRY & NEUROLOGY

## 2024-03-16 PROCEDURE — 1240000001 HC SEMI-PRIVATE BH ROOM DAILY

## 2024-03-16 RX ADMIN — QUETIAPINE FUMARATE 175 MG: 100 TABLET ORAL at 20:44

## 2024-03-16 ASSESSMENT — PAIN SCALES - GENERAL
PAINLEVEL_OUTOF10: 0 - NO PAIN
PAINLEVEL_OUTOF10: 0 - NO PAIN

## 2024-03-16 ASSESSMENT — PAIN - FUNCTIONAL ASSESSMENT
PAIN_FUNCTIONAL_ASSESSMENT: 0-10
PAIN_FUNCTIONAL_ASSESSMENT: 0-10

## 2024-03-16 NOTE — CARE PLAN
"  Problem: Sensory Perceptual Alteration as Evidenced by  Goal: Participates in unit activities  Outcome: Progressing     Problem: Ineffective Coping  Goal: Identifies healthy coping skills  Outcome: Progressing     Problem: Self Care Deficit  Goal: Accepts need for medications  Outcome: Progressing   The patient's goals for the shift include \"Figure out what's going on. foreign has told me a plan.\"    The clinical goals for the shift include maintain safety    Over the shift, the patient not make progress toward the following goals. The pt had an uneventful day. The pt was observed participating in group, on the phone and interacting with her peers. Q 15 minute monitoring continued.     "

## 2024-03-16 NOTE — NURSING NOTE
Pt took  a shower last night  came out to the front lounge and ate her snack  Pt took her night time medications and went to bed  Pt slept all night long Pt had an uneventful night

## 2024-03-16 NOTE — PROGRESS NOTES
"Robert Momin is a 48 y.o. female on day 2 of admission presenting with Acute psychosis (CMS/HCC).      Subjective   The patient was seen and examined. I reviewed the chart and vital signs from overnight. I reviewed previous notes. I reviewed medications, administered overnight and their reported benefits or side effects. Reported the female AH are still present, she slept bit better, denied depression and anxiety. After rounds, was crying in her room due to breakup. States she appreciated the help she is getting her and wants to conhcis her med adjusted. Attending group,med adherent, help seeking.       Objective     Last Recorded Vitals  Blood pressure 140/87, pulse 92, temperature 36.6 °C (97.9 °F), resp. rate 16, height 1.702 m (5' 7.01\"), weight 109 kg (239 lb 3.2 oz), SpO2 98 %.    Review of Systems    Psychiatric ROS - Adult  Anxiety: General Anxiety Disorder (JOE)JOE Behaviors: difficult to control worry, excessive anxiety/worry, and difficulty concentrating  Depression: anhedonia  Delirium: negative  Psychosis: auditory hallucinations  Jeanne: negative  Safety Issues: none    Mental Status Exam:   General: cf with psychosis  Appearance: Appears stated age, reddish hair, wearing glasses, in hosptial gown, fair grooming and hygiene  Attitude: Anxious, cooperative.  Behavior: Appropriate eye contact.  Motor Activity: No agitation or retardation. No EPS/TD.  Normal gait and station. Normal muscle tone and bulk.  Speech: Regular rate, rhythm, volume and tone, spontaneous,  fluent. Non-pressured.  Mood: \"anxious\"  Affect: crying today  Thought Process: Organized, linear, goal directed.  Thought Content: Does not endorse suicidal ideation or any suicide plans, does not endorse homicidal ideation., no delusions elicited  Thought Perception: Endorses auditory hallucinations, does not appear to be responding to hallucinatory stimuli, denies visual hallucinations  Cognition: Alert, oriented x 3. No deficits noted.  " Adequate fund of knowledge. No deficit in recent and remote memory. No deficits in attention, concentration or language.  Insight: Poor, as patient does not recognize symptoms of  illness and need for recommended treatments.   Judgment:Poor as patient cannot make reasonable decisions about ordinary activities of daily living and necessary medical care recommendations.     IMPRESSION:  Bipolar, manic severe with psychosis AH and paranoia  Panic Do without agoraphobia, moderate panic attacks  Htn  Lipidemia  Gerd  Pre-DM       Plan:  Increase seroquel 150mg to 175mg at bedtime, per patient request, prefers slow med increases  Work on anxiety and coping skills           I spent 29 minutes in the professional and overall care of this patient.      Mi Gutierrez MD

## 2024-03-16 NOTE — NURSING NOTE
"Pt interview in the patients room Pt is calm and cooperative  Pt is waiting to take a shower and then come out for her snack  Pt stated her goal \" get sleep\"  her strength \" Im a strong person\"  and her coping skill  \" I listen to music\"  Pt rated her anxiety 4/10 and denied everything else at this time  Pt is appropriate with her answers to the questions at this time   "

## 2024-03-16 NOTE — NURSING NOTE
"The pt was calm and cooperative during the interview that took place in at the end of the jackson away from her peers for privacy. Pt denies anxiety and depression rating them both 0/10, denies SI, HI and AVH at this time as well as pain rating it a 0/10. Last bowel movement was 3/15/24. Coping skills, \"Listening to my music.\" Goal for the day, \"Figure out what's going on. Nobody has told me a plan.\" Pt strengths, \"I'm strong and can keep myself going.\" The pt did not have any morning medications. Q 15 minute monitoring continued.   "

## 2024-03-17 LAB
25(OH)D3 SERPL-MCNC: 18 NG/ML (ref 30–100)
CHOLEST SERPL-MCNC: 141 MG/DL (ref 0–199)
CHOLESTEROL/HDL RATIO: 3
GLUCOSE P FAST SERPL-MCNC: 116 MG/DL (ref 74–99)
HDLC SERPL-MCNC: 47.6 MG/DL
LDLC SERPL CALC-MCNC: 70 MG/DL
NON HDL CHOLESTEROL: 93 MG/DL (ref 0–149)
TRIGL SERPL-MCNC: 118 MG/DL (ref 0–149)
VLDL: 24 MG/DL (ref 0–40)

## 2024-03-17 PROCEDURE — 80061 LIPID PANEL: CPT | Performed by: PSYCHIATRY & NEUROLOGY

## 2024-03-17 PROCEDURE — 2500000002 HC RX 250 W HCPCS SELF ADMINISTERED DRUGS (ALT 637 FOR MEDICARE OP, ALT 636 FOR OP/ED): Performed by: PSYCHIATRY & NEUROLOGY

## 2024-03-17 PROCEDURE — 82306 VITAMIN D 25 HYDROXY: CPT | Mod: GEALAB | Performed by: PSYCHIATRY & NEUROLOGY

## 2024-03-17 PROCEDURE — 1240000001 HC SEMI-PRIVATE BH ROOM DAILY

## 2024-03-17 PROCEDURE — 99233 SBSQ HOSP IP/OBS HIGH 50: CPT | Performed by: PSYCHIATRY & NEUROLOGY

## 2024-03-17 PROCEDURE — 82947 ASSAY GLUCOSE BLOOD QUANT: CPT | Performed by: PSYCHIATRY & NEUROLOGY

## 2024-03-17 PROCEDURE — 36415 COLL VENOUS BLD VENIPUNCTURE: CPT | Performed by: PSYCHIATRY & NEUROLOGY

## 2024-03-17 RX ORDER — QUETIAPINE FUMARATE 25 MG/1
12.5 TABLET, FILM COATED ORAL DAILY PRN
Status: DISCONTINUED | OUTPATIENT
Start: 2024-03-17 | End: 2024-03-22 | Stop reason: HOSPADM

## 2024-03-17 RX ORDER — QUETIAPINE FUMARATE 25 MG/1
12.5 TABLET, FILM COATED ORAL DAILY
Status: DISCONTINUED | OUTPATIENT
Start: 2024-03-18 | End: 2024-03-18

## 2024-03-17 RX ADMIN — QUETIAPINE FUMARATE 175 MG: 100 TABLET ORAL at 21:00

## 2024-03-17 ASSESSMENT — COLUMBIA-SUICIDE SEVERITY RATING SCALE - C-SSRS
1. SINCE LAST CONTACT, HAVE YOU WISHED YOU WERE DEAD OR WISHED YOU COULD GO TO SLEEP AND NOT WAKE UP?: NO
6. HAVE YOU EVER DONE ANYTHING, STARTED TO DO ANYTHING, OR PREPARED TO DO ANYTHING TO END YOUR LIFE?: NO
2. HAVE YOU ACTUALLY HAD ANY THOUGHTS OF KILLING YOURSELF?: NO

## 2024-03-17 ASSESSMENT — PAIN SCALES - GENERAL
PAINLEVEL_OUTOF10: 0 - NO PAIN
PAINLEVEL_OUTOF10: 0 - NO PAIN

## 2024-03-17 ASSESSMENT — PAIN - FUNCTIONAL ASSESSMENT: PAIN_FUNCTIONAL_ASSESSMENT: 0-10

## 2024-03-17 NOTE — CARE PLAN
"Patient is cooperative. She rates anxiety 10, depression 10, anxiety is high due to \"frustration with staff for not listening\" to patient, she believes that her wife is close to the hospital, patient reassured that that is not the case. She denies SI/HI, endorses AH \"my wife I hear her everywhere.\" She would pause during assessment and become tearful and start deep breathing. She agreed to add Seroquel 12.5 mg with breakfast and PRN Seroquel 12.5 mg at 1500. No PRN's given. She is currently coloring in front lounge.     "

## 2024-03-17 NOTE — CARE PLAN
"The patient's goals for the shift include \"get some answers\"    The clinical goals for the shift include treatment compliane    Problem: Sensory Perceptual Alteration as Evidenced by  Goal: Participates in unit activities  Outcome: Progressing     Problem: Ineffective Coping  Goal: Identifies healthy coping skills  Outcome: Progressing  Goal: Demonstrates healthy coping skills  Outcome: Progressing     Problem: Alteration in Sleep  Goal: STG - Reports nightly sleep, duration, and quality  Outcome: Progressing     Problem: Self Care Deficit  Goal: Accepts need for medications  Outcome: Progressing     Over the shift, the patient did make progress toward the following goals.     "

## 2024-03-17 NOTE — NURSING NOTE
"03/16/2024 @2130- Pt was tearful.  Pt rated anxiety 8/10 and depression 10/10, pt denied SI/HI, and denied pain at this time. Pt did endorse auditory hallucinations of \"my wife's voice.\" Pt stated her goal is \"get some answers,\" and her coping skill is \"coloring\" Pt stated her strengths are \"strong, kind and brave.\" Pt took all scheduled medications without any issues, no PRNs needed.\" Pt is currently laying in bed without any signs or symptoms of distress. No new orders to carry out at this time. Q15 minute safety checks maintained.         03/17/2024 @0530- Pt had an uneventful night, pt slept well throughout. No PRNs needed. Pt is currently sitting in the front lounge without any signs or symptoms of distress. No new orders to carry out at this time. Q15 minute safety checks maintained.   "

## 2024-03-17 NOTE — PROGRESS NOTES
"Robert Momin is a 48 y.o. female on day 3 of admission presenting with Acute psychosis (CMS/HCC).      Subjective   The patient was seen and examined. I reviewed the chart and vital signs from overnight. I reviewed previous notes. I reviewed medications, administered overnight and their reported benefits or side effects. Reported the female AH are still present, she slept bit better, was more honest about the depression and anxiety today. Reported the ah are present, they seem real, feels \"no one believes her\", of note none of the team has suggested that her experiences are not present. I talked to her about brain function, and how this happens, that the experience is very real, which is what the brain make, talked about differential, how common an experience this is for many patients, role of meds and therapy.       Objective     Last Recorded Vitals  Blood pressure 132/87, pulse (!) 111, temperature 36.8 °C (98.2 °F), temperature source Temporal, resp. rate 16, height 1.702 m (5' 7.01\"), weight 109 kg (239 lb 3.2 oz), SpO2 97 %.    Review of Systems    Psychiatric ROS - Adult  Anxiety: General Anxiety Disorder (JOE)JOE Behaviors: difficult to control worry, excessive anxiety/worry, and difficulty concentrating  Depression: anhedonia  Delirium: negative  Psychosis: auditory hallucinations  Jeanne: negative  Safety Issues: none    Mental Status Exam:   General: cf with psychosis  Appearance: Appears stated age, reddish hair, wearing glasses, in hosptial gown, fair grooming and hygiene  Attitude: Anxious, cooperative.  Behavior: Appropriate eye contact.  Motor Activity: No agitation or retardation. No EPS/TD.  Normal gait and station. Normal muscle tone and bulk.  Speech: Regular rate, rhythm, volume and tone, spontaneous,  fluent. Non-pressured.  Mood: \"anxious\"  Affect: crying today  Thought Process: Organized, linear, goal directed.  Thought Content: Does not endorse suicidal ideation or any suicide plans, does " not endorse homicidal ideation., no delusions elicited  Thought Perception: Endorses auditory hallucinations, does not appear to be responding to hallucinatory stimuli, denies visual hallucinations  Cognition: Alert, oriented x 3. No deficits noted.  Adequate fund of knowledge. No deficit in recent and remote memory. No deficits in attention, concentration or language.  Insight: Poor, as patient does not recognize symptoms of  illness and need for recommended treatments.   Judgment:Poor as patient cannot make reasonable decisions about ordinary activities of daily living and necessary medical care recommendations.     IMPRESSION:  Bipolar, manic severe with psychosis AH and paranoia  Panic Do without agoraphobia, moderate panic attacks  Htn  Lipidemia  Gerd  Pre-DM       Plan:  Increase seroquel 150mg to 175mg at bedtime, per patient request, prefers slow med increases  Work on anxiety and coping skills   3/17/24: agreed to add on seroquel 12.5mg with breakfast, and 12.5mg 2-3 pm prn continue 175m ghs as patient felt groggy from the 175mg dose.        I spent 29 minutes in the professional and overall care of this patient.      Mi Guteirrez MD

## 2024-03-18 PROCEDURE — 1240000001 HC SEMI-PRIVATE BH ROOM DAILY

## 2024-03-18 PROCEDURE — 99233 SBSQ HOSP IP/OBS HIGH 50: CPT | Performed by: PSYCHIATRY & NEUROLOGY

## 2024-03-18 PROCEDURE — 2500000002 HC RX 250 W HCPCS SELF ADMINISTERED DRUGS (ALT 637 FOR MEDICARE OP, ALT 636 FOR OP/ED): Performed by: PSYCHIATRY & NEUROLOGY

## 2024-03-18 PROCEDURE — 97150 GROUP THERAPEUTIC PROCEDURES: CPT | Mod: GO,CO

## 2024-03-18 PROCEDURE — 2500000001 HC RX 250 WO HCPCS SELF ADMINISTERED DRUGS (ALT 637 FOR MEDICARE OP): Performed by: PSYCHIATRY & NEUROLOGY

## 2024-03-18 RX ORDER — QUETIAPINE FUMARATE 200 MG/1
200 TABLET, FILM COATED ORAL NIGHTLY
Status: DISCONTINUED | OUTPATIENT
Start: 2024-03-18 | End: 2024-03-19

## 2024-03-18 RX ORDER — DOCUSATE SODIUM 100 MG/1
100 CAPSULE, LIQUID FILLED ORAL 2 TIMES DAILY
Status: DISCONTINUED | OUTPATIENT
Start: 2024-03-18 | End: 2024-03-22 | Stop reason: HOSPADM

## 2024-03-18 RX ADMIN — QUETIAPINE FUMARATE 200 MG: 200 TABLET ORAL at 20:57

## 2024-03-18 RX ADMIN — DOCUSATE SODIUM 100 MG: 100 CAPSULE, LIQUID FILLED ORAL at 20:57

## 2024-03-18 RX ADMIN — DOCUSATE SODIUM 100 MG: 100 CAPSULE, LIQUID FILLED ORAL at 13:22

## 2024-03-18 ASSESSMENT — PAIN - FUNCTIONAL ASSESSMENT
PAIN_FUNCTIONAL_ASSESSMENT: 0-10
PAIN_FUNCTIONAL_ASSESSMENT: 0-10

## 2024-03-18 ASSESSMENT — PAIN SCALES - GENERAL
PAINLEVEL_OUTOF10: 0 - NO PAIN
PAINLEVEL_OUTOF10: 0 - NO PAIN

## 2024-03-18 NOTE — CARE PLAN
"The patient's goals for the shift include \"I want to know whats going on\"    The clinical goals for the shift include maintain patient safety    Over the shift, the patient did make progress toward the following goals.     Problem: Sensory Perceptual Alteration as Evidenced by  Goal: Participates in unit activities  Outcome: Progressing     Problem: Ineffective Coping  Goal: Identifies healthy coping skills  Outcome: Progressing     Problem: Ineffective Coping  Goal: Demonstrates healthy coping skills  Outcome: Progressing     Problem: Risk for Suicide  Goal: Accepts medications as prescribed/needed this shift  Outcome: Progressing     Problem: Risk for Suicide  Goal: Makes needs known through verbalization or behaviors this shift  Outcome: Progressing       "

## 2024-03-18 NOTE — PROGRESS NOTES
"Robert Momin is a 48 y.o. year old female patient who is on [facility] admission day 4.      Subjective   Robert Momin is a 48 y.o. year old female patient who was personally seen and interviewed, and discussed in morning team rounds. The patient was interviewed alone at the end of the hallway (interviewed sitting in a chair), and was easily engaged and cooperative. This morning, Robert reports feeling \"uneasy\" and currently rates her depression at a 3 out of 10. She reports that her anxiety is because \"nobody believes me\" and \"everyone thinks I'm crazy\". She is also worried because she has not had a BM in 2 days which is very unusual for her. Robert reports that she asked a nurse for something to help her go but that they told her to talk to the doctor. No suicidal ideation or plans were elicited. She also rates her anxiety at a 8 out of 10. Robert does continue to endorse AH, hearing her ex's voice, and delusions of reference. She continue to believe that the TVs and internet are speaking with her. Robert slept 8 hours last night (broken). She does feel like she has been sleeping better. Robert reports low appetite because she does not like the food here.           Objective   Mental Status Exam:   General: Appropriately groomed and dressed in hospital attire.   Appearance: Appears stated age.   Attitude: Calm, cooperative.   Behavior: Appropriate eye contact.   Motor Activity: No agitation or retardation. No EPS/TD.  Normal gait and station. Normal muscle tone and bulk.   Speech: Regular rate, rhythm, volume and tone, spontaneous,  fluent. Non-pressured.   Mood: \"uneasy\"   Affect: Neutral.   Thought Process: Organized, linear, goal directed.   Thought Content: Does not endorse suicidal ideation or any suicide plans.   Does not endorse homicidal ideation.  Endorses delusions of reference.    Thought Perception: Does endorse auditory hallucinations, does not appear to be responding to hallucinatory stimuli. "   Cognition: Alert, oriented x 3. No deficits noted.  Adequate fund of knowledge. No deficit in recent and remote memory. No deficits in attention, concentration or language.   Insight: Poor, as patient does not recognize symptoms of  illness and need for recommended treatments.    Judgment: Poor, as patient cannot make reasonable decisions about ordinary activities of daily living and necessary medical care recommendations.       LABS:  No results found for this or any previous visit (from the past 24 hour(s)).     Last Recorded Vitals  Visit Vitals  BP (!) 143/91 (BP Location: Right arm, Patient Position: Sitting)   Pulse 91   Temp 37.3 °C (99.1 °F) (Temporal)   Resp 18        Intake/Output last 3 Shifts:  No intake/output data recorded.    Relevant Results  Scheduled medications  QUEtiapine, 12.5 mg, oral, Daily  QUEtiapine, 175 mg, oral, Nightly      Continuous medications     PRN medications  PRN medications: acetaminophen, alum-mag hydroxide-simeth, diphenhydrAMINE **OR** diphenhydrAMINE, haloperidol **OR** haloperidol lactate, hydrOXYzine pamoate, LORazepam **OR** LORazepam, melatonin, psyllium, QUEtiapine               Assessment/Plan   1) Bipolar Disorder, type I, currently manic, severe with psychosis (AH, paranoia)       Plan: *1) Increase Seroquel 150mg --> 175 mg at bedtime 3/15/24     2) Add on seroquel 12.5mg with breakfast, and 12.5mg 2-3 pm prn 3/17/24                2) Group and Milieu therapy  Discussed potential risks, benefits, and alternatives to medications with patient, who consented to the above medications.     2) Panic Disorder without Agoraphobia, with moderate panic attacks       Plan: 1) See above     3) Other specified anxiety       Plan: 1) See above     4) Hypertension       Plan: 1) IM service to follow and manage      5) HLD       Plan: 1) IM service to follow and manage      6) GERD       Plan: 1) IM service to follow and manage      7) Pre-DM       Plan: 1) IM service to follow  and manage      LEVY Bright-Student

## 2024-03-18 NOTE — CARE PLAN
"The patient's goals for the shift include \"get the hell out of here\"    The clinical goals for the shift include medication compliance    Problem: Sensory Perceptual Alteration as Evidenced by  Goal: Participates in unit activities  Outcome: Progressing     Problem: Ineffective Coping  Goal: Identifies healthy coping skills  Outcome: Progressing  Goal: Demonstrates healthy coping skills  Outcome: Progressing     Problem: Alteration in Sleep  Goal: STG - Reports nightly sleep, duration, and quality  Outcome: Progressing     Over the shift, the patient did make progress toward the following goals.   "

## 2024-03-18 NOTE — NURSING NOTE
"Patient out on the unit and social with peers this shift. Rated anxiety 3-4/10 and depression 3-4/10. Denied SI/HI. Denied visual/other hallucinations. Patient endorses auditory hallucinations, patient states they can hear their ex talking. Voices are saying \"I love you, you are brave, you are strong\". Patient stated that they haven't had a bowel movement in two days and that it is uncommon for them to experience that. New order for Colace 100 mg BID. No complaints of pain or discomfort. Patient has attended group therapy this shift. Medication compliant. Able to state positive coping skills such as \"coloring and word searches\". Patient has been pleasant and cooperative this shift. Q15 minute checks to be maintained throughout shift for safety.    "

## 2024-03-18 NOTE — PROGRESS NOTES
"Occupational Therapy     REHAB Therapy Assessment & Treatment    Patient Name: Robert Momin  MRN: [id number]  Today's Date: 3/18/2024      Activity Assessment:     Self-Discovery and Personal Understanding Group: [id number]  [place] Education and Identification Group: [id number]  Cognitive Task/ Team Collaboration Group: [id number]  Open Recreation Group: [id number]    4/4 Groups attended       Pt present and engages in all groups with improved confidence when sharing as well as an increase in attention seeking hyper verbal behaviors this date as compared to previous groups. Pt requires min verbal redirection cues d/t hyper verbal comments and an increase in using foul language during group. Pt is receptive. Pt demanais's G/F understanding of self discovery education and is able to complete her handout without assistance sharing aloud \"I value being a loyal wife, good listener, I want to make everyone proud, I am currently working on a better version of myself, and my greatest achievement so far is my son\" During cog task group, pt appears more focused and with improved processing time >75% of the time. Pt required no redirection cues during this activity and was able to ID using this task as a possible future coping tool. Pt with fluctuating mood and attention to task this date as well as some impaired insight making progress F this date toward OT goals. Pt would benefit from continued OT services in order to improve overall self-esteem, personal confidence and supports with increased awareness for safe transition location at discharge.      Encounter Problems       Encounter Problems (Active)       OT Goals       Pt will ID 3 strategies to manage symptoms of grief to improve daily function and QoL (Progressing)       Start:  03/14/24    Expected End:  04/04/24            Pt will improve ability to ID and express emotions while accepting and tolerating all emotions, in order to increase awareness of positive emotions.  " (Progressing)       Start:  03/14/24    Expected End:  04/04/24            Pt will show increased reality based behavior in OT groups or 1:1 interactions prior to discharge 100% of the time.  (Progressing)       Start:  03/14/24    Expected End:  04/04/24            Pt will explore and ID 1-2 strategies to manage stressors/symptoms of illness/ grief more effectively prior to discharge (Progressing)       Start:  03/14/24    Expected End:  04/04/24            Pt will ID 2 community resources/programs to join/attend after D/C to improve their support system (Progressing)       Start:  03/14/24    Expected End:  04/04/24                         Additional Comments:    BRAVO collaborated with patients nurse and charge nurse throughout the day to provide the appropriate support and encouragement to attend groups. Pt up on unit when BRAVO left last group of the day. All needs met.

## 2024-03-18 NOTE — NURSING NOTE
"03/17/2024 @2130- Pt was calm and cooperative. Pt rated both anxiety and depression 10/10, pt denied SI/HI, and pain at this time. Pt did endorse auditory hallucinations of her wife saying \"I love you and I'm sorry.\" Pt stated her goal is \"get the hell out of here,\" and her coping skill is \"breathing.\" Pt stated her strengths are \"strong and brave.\" Pt took all scheduled medications without any issues, no PRNs needed. Pt is currently sleeping in bed without any signs or symptoms of distress. No new orders to carry out at this time. Q15 minute safety checks maintained.         03/18/2024 @0530- Pt had an uneventful night, pt slept well throughout. No PRNs needed. Pt is currently sleeping in bed without any signs or symptoms of distress. No new orders to carry out at this time. Q15 minute safety checks maintained.   "

## 2024-03-19 PROCEDURE — 2500000002 HC RX 250 W HCPCS SELF ADMINISTERED DRUGS (ALT 637 FOR MEDICARE OP, ALT 636 FOR OP/ED): Performed by: PSYCHIATRY & NEUROLOGY

## 2024-03-19 PROCEDURE — 1240000001 HC SEMI-PRIVATE BH ROOM DAILY

## 2024-03-19 PROCEDURE — 97150 GROUP THERAPEUTIC PROCEDURES: CPT | Mod: GO,CO

## 2024-03-19 PROCEDURE — 2500000001 HC RX 250 WO HCPCS SELF ADMINISTERED DRUGS (ALT 637 FOR MEDICARE OP): Performed by: PSYCHIATRY & NEUROLOGY

## 2024-03-19 PROCEDURE — 99232 SBSQ HOSP IP/OBS MODERATE 35: CPT | Performed by: PSYCHIATRY & NEUROLOGY

## 2024-03-19 RX ORDER — BUSPIRONE HYDROCHLORIDE 5 MG/1
5 TABLET ORAL 3 TIMES DAILY
Status: DISCONTINUED | OUTPATIENT
Start: 2024-03-19 | End: 2024-03-22 | Stop reason: HOSPADM

## 2024-03-19 RX ADMIN — DOCUSATE SODIUM 100 MG: 100 CAPSULE, LIQUID FILLED ORAL at 08:50

## 2024-03-19 RX ADMIN — QUETIAPINE FUMARATE 225 MG: 200 TABLET ORAL at 20:34

## 2024-03-19 RX ADMIN — DOCUSATE SODIUM 100 MG: 100 CAPSULE, LIQUID FILLED ORAL at 20:35

## 2024-03-19 RX ADMIN — BUSPIRONE HYDROCHLORIDE 5 MG: 5 TABLET ORAL at 20:35

## 2024-03-19 RX ADMIN — BUSPIRONE HYDROCHLORIDE 5 MG: 5 TABLET ORAL at 10:33

## 2024-03-19 RX ADMIN — HYDROXYZINE PAMOATE 50 MG: 50 CAPSULE ORAL at 08:50

## 2024-03-19 RX ADMIN — BUSPIRONE HYDROCHLORIDE 5 MG: 5 TABLET ORAL at 15:04

## 2024-03-19 ASSESSMENT — PAIN SCALES - GENERAL
PAINLEVEL_OUTOF10: 0 - NO PAIN
PAINLEVEL_OUTOF10: 0 - NO PAIN

## 2024-03-19 ASSESSMENT — PAIN - FUNCTIONAL ASSESSMENT
PAIN_FUNCTIONAL_ASSESSMENT: 0-10
PAIN_FUNCTIONAL_ASSESSMENT: 0-10

## 2024-03-19 NOTE — GROUP NOTE
"Group Topic: Cognitive Focus   Group Date: 3/19/2024  Start Time: 1400  End Time: 1450  Facilitators: EVERETT ResendezS   Department: [facility] REHAB THERAPY VIRTUAL    Number of Participants: 7   Group Focus: concentration, leisure skills, and problem solving  Treatment Modality: Other: Recreational Therapy  Interventions utilized were exploration, leisure development, mental fitness, and problem solving  Purpose: other: leisure awareness, cognitive skills/focus, fine motor, social engagement, teamwork, healthy competition     Name: Robert Momin YOB: 1975   MR: [id number]      Facilitator: Recreational Therapist  Level of Participation: active  Quality of Participation: appropriate/pleasant, cooperative, and engaged  Interactions with others: appropriate  Mood/Affect: appropriate, brightens with interaction, and positive  Triggers (if applicable): n/a  Cognition: coherent/clear  Progress: Moderate  Comments: In this Recreational Therapy session, patients engaged in \"The Uzzle\" game, fostering a collaborative and interactive environment. The activity aimed to enhance socialization, cognitive and fine motor skills, and teamwork among patients, promoting a positive therapeutic experience.    Pt was calm, cooperative, and engaged appropriately in group task. She did need some encouragement to attend initially after stating she needed some \"time to myself\", but pleasant, social and worked well with peer.   Plan: continue with services      "

## 2024-03-19 NOTE — NURSING NOTE
Pt was in the front lounge She was coloring with another patient next to her  Pt took her night time medications  Pt took a shower and went to bed  Pt had an uneventful night

## 2024-03-19 NOTE — GROUP NOTE
Group Topic: Dialectical Behavioral Therapy - Distress Tolerance   Group Date: 3/19/2024  Start Time: 1600  End Time: 1650  Facilitators: MICHELET Resendez; MICHELET Rodriguez   Department: [facility] REHAB THERAPY VIRTUAL    Number of Participants: 8   Group Focus: coping skills, relapse prevention, and substance abuse education  Treatment Modality: Recreation Therapy  Interventions utilized were exploration, mental fitness, and support  Purpose: coping skills, insight or knowledge, and relapse prevention strategies    Name: Robert Momin YOB: 1975   MR: [id number]      Facilitator: Recreational Therapist  Level of Participation: active  Quality of Participation: appropriate/pleasant and cooperative  Interactions with others: appropriate  Mood/Affect: anxious and appropriate  Triggers (if applicable): N/A  Cognition: coherent/clear  Progress: Moderate  Comments: This session provided education using Dialectical Behavioral Therapy Distress Tolerance skills focusing on “Dialectical Abstinence”. Participants were provided opportunities to share thoughts and ask questions related to the topics being discussed. The group was provided multiple examples of “abstinence” and “harm reduction”. The session also included understanding coping techniques to practice both addictive behavior management techniques.     Patient attended most of the session and completed all desired session tasks.     Plan: continue with services

## 2024-03-19 NOTE — NURSING NOTE
"Patient out on the unit and social with peers this shift. Rated anxiety 10+/10 and depression 10+/10. Patient states that \"I can feel a lot of energy\" there was student nurses on the unit at that time. Denied SI/HI. When asked if patient experiences hallucinations patient responded by stating \"maybe\". Patient states that everyone looks familiar. No complaints of pain or discomfort. Patient has attended group therapy this shift. Medication compliant. Able to state positive coping skills such as \"deep breathing, listen to music, and watch TV\". Patient has been calm and demanding with staff this shift. Q 15 minute checks to be maintained throughout shift for safety.    "

## 2024-03-19 NOTE — PROGRESS NOTES
"Occupational Therapy     REHAB Therapy Assessment & Treatment    Patient Name: Robert Momin  MRN: [id number]  Today's Date: 3/19/2024      Activity Assessment:    Self-[place]/ Personal Awareness Group: [phone]  Self-Esteem and Humor as a Coping Tool Group: [id number]  Community Resources and Positive Supports Group: [id number]    3/3 Groups attended     Pt present and engages well in all groups this date with slightly improved attention to task as well as less hyper verbal behaviors as compared to previous date. Pt demo's G understanding of self worth education as well as appropriately shares understanding aloud with G confidence. Pt then able to create a list of current struggles \"stressed, others think they know what's best for me, anxious, overwhelmed about the future\" Pt then able to create a list of positives to compare to improve overall quality of thoughts and improve mindset for discharge carry over as \"My wife helps keep me happy, babe is the solution, Im not crazy, and I am strong\" During self esteem and CR groups, pt continues to demo improved attention to task and understanding of topics presented. Pt able to ID X3 personal strengths without hesitation as \"good mom, good wife, and good friend\" Pt receptive to all education and handouts provided in CR group as well as asks appropriate questions 100% of the time. Pt with slight improvement this date in insight, reality based behaviors, and improvement in CR understanding for carry over. Pt would benefit from continued OT services in order to improve overall self-esteem, personal confidence and supports with increased awareness for safe transition location at discharge.      Encounter Problems       Encounter Problems (Active)       OT Goals       Pt will ID 3 strategies to manage symptoms of grief to improve daily function and QoL (Progressing)       Start:  03/14/24    Expected End:  04/04/24            Pt will improve ability to ID and express emotions " while accepting and tolerating all emotions, in order to increase awareness of positive emotions.  (Progressing)       Start:  03/14/24    Expected End:  04/04/24            Pt will show increased reality based behavior in OT groups or 1:1 interactions prior to discharge 100% of the time.  (Progressing)       Start:  03/14/24    Expected End:  04/04/24            Pt will explore and ID 1-2 strategies to manage stressors/symptoms of illness/ grief more effectively prior to discharge (Progressing)       Start:  03/14/24    Expected End:  04/04/24            Pt will ID 2 community resources/programs to join/attend after D/C to improve their support system (Progressing)       Start:  03/14/24    Expected End:  04/04/24                     Additional Comments:    BRAVO collaborated with patients nurse and charge nurse throughout the day to provide the appropriate support and encouragement to attend groups. Pt up on unit when BRAVO left last group of the day. All needs met.

## 2024-03-19 NOTE — NURSING NOTE
"[facility]  Pt is calm and cooperative  Pt stated her goal \" get sleep\"  her strength \"I color\"  and her coping skill \" I deep breath\"  Pt rated her anxiety 1-2/10  depression 1-2/10 and denied everything else at this time  Pt is appropriate with her answers to the questions at this time   "

## 2024-03-19 NOTE — PROGRESS NOTES
"Robert Momin is a 48 y.o. year old female patient who is on [facility] admission day 5.      Subjective   The patient was personally seen and interviewed, and discussed in morning team rounds. The patient was interviewed alone at the end of the hallway (interviewed sitting in a chair), and was easily engaged and cooperative. This morning, Robert reports feeling \"anxious\" and currently rates her depression at a 3-4 out of 10. No suicidal ideation or plans were elicited. She also rates her anxiety at a \"10 plus\" out of 10. Robert also reports experiencing ongoing auditory hallucinations of hearing her \"wife... and music\" but no visual hallucinations. She also reports ongoing delusions of reference from the TV (out in the lounge) telling her about her \"wife\" (and from the radio prior to admission). Robert also reports feelings of paranoia regarding the feeling that someone is \"watching me.\"   Robert slept 5.25 hours last night (broken).            Objective   Mental Status Exam:   General: Appropriately groomed and dressed in hospital attire.   Appearance: Appears stated age.   Attitude: Calm, cooperative.   Behavior: Appropriate eye contact.   Motor Activity: No agitation or retardation. No EPS/TD.  Normal gait and station. Normal muscle tone and bulk.   Speech: Regular rate, rhythm, volume and tone, spontaneous,  fluent. Non-pressured.   Mood: \"Nervous\"   Affect: Mildly labile (anxious to slightly irritable to laughing).   Thought Process: Organized, and goal directed.   Thought Content: Does not endorse suicidal ideation or any suicide plans.   Does not endorse homicidal ideation.  Endorses delusions of reference (see HPI).    Thought Perception: Does endorse auditory hallucinations, does not appear to be responding to hallucinatory stimuli (see HPI).   Cognition: Alert, oriented x 3. No deficits noted.  Adequate fund of knowledge. No deficit in recent and remote memory. No deficits in attention, concentration or " language.   Insight: Poor, as patient does not recognize symptoms of  illness and need for recommended treatments.    Judgment: Poor-to-fair, as patient can make some reasonable decisions about ordinary activities of daily living and necessary medical care recommendations.       LABS:  No results found for this or any previous visit (from the past 24 hour(s)).     Last Recorded Vitals  Visit Vitals  /89 (BP Location: Right arm, Patient Position: Sitting)   Pulse 101   Temp 36.2 °C (97.2 °F)   Resp 18        Intake/Output last 3 Shifts:  No intake/output data recorded.    Relevant Results  Scheduled medications  busPIRone, 5 mg, oral, TID  docusate sodium, 100 mg, oral, BID  QUEtiapine, 225 mg, oral, Nightly      Continuous medications     PRN medications  PRN medications: acetaminophen, alum-mag hydroxide-simeth, diphenhydrAMINE **OR** diphenhydrAMINE, haloperidol **OR** haloperidol lactate, hydrOXYzine pamoate, LORazepam **OR** LORazepam, melatonin, psyllium, QUEtiapine               Assessment/Plan   1) Bipolar Disorder, type I, currently manic, severe with psychosis (AH, paranoia)       Plan: *1) Increase Seroquel 150 ->175 -> 200 -> 225 mg at bedtime.     *2) trial Buspar 5 mg TiD (for anxiety)                 3) Group and Milieu therapy  Discussed potential risks, benefits, and alternatives to medications with patient, who consented to the above medications.     2) Panic Disorder without Agoraphobia, with moderate panic attacks       Plan: 1) See above     3) Other specified anxiety       Plan: 1) See above     4) Hypertension       Plan: 1) IM service to follow and manage      5) HLD       Plan: 1) IM service to follow and manage      6) GERD       Plan: 1) IM service to follow and manage      7) Pre-DM       Plan: 1) IM service to follow and manage          MARCELLUS Kaplan MD

## 2024-03-19 NOTE — CARE PLAN
"The patient's goals for the shift include \"I want to know exactly what's going on\"    The clinical goals for the shift include medication compliance    Over the shift, the patient did make progress toward the following goals.     Problem: Sensory Perceptual Alteration as Evidenced by  Goal: Participates in unit activities  Outcome: Progressing     Problem: Ineffective Coping  Goal: Identifies healthy coping skills  Outcome: Progressing     Problem: Ineffective Coping  Goal: Demonstrates healthy coping skills  Outcome: Progressing     Problem: Self Care Deficit  Goal: Accepts need for medications  Outcome: Progressing     Problem: Risk for Suicide  Goal: Identifies supports this shift  Outcome: Progressing       "

## 2024-03-20 PROCEDURE — 2500000001 HC RX 250 WO HCPCS SELF ADMINISTERED DRUGS (ALT 637 FOR MEDICARE OP): Performed by: PSYCHIATRY & NEUROLOGY

## 2024-03-20 PROCEDURE — 99232 SBSQ HOSP IP/OBS MODERATE 35: CPT | Performed by: PSYCHIATRY & NEUROLOGY

## 2024-03-20 PROCEDURE — 97150 GROUP THERAPEUTIC PROCEDURES: CPT | Mod: GO,CO

## 2024-03-20 PROCEDURE — 1240000001 HC SEMI-PRIVATE BH ROOM DAILY

## 2024-03-20 PROCEDURE — 2500000002 HC RX 250 W HCPCS SELF ADMINISTERED DRUGS (ALT 637 FOR MEDICARE OP, ALT 636 FOR OP/ED): Performed by: PSYCHIATRY & NEUROLOGY

## 2024-03-20 RX ADMIN — BUSPIRONE HYDROCHLORIDE 5 MG: 5 TABLET ORAL at 20:35

## 2024-03-20 RX ADMIN — BUSPIRONE HYDROCHLORIDE 5 MG: 5 TABLET ORAL at 08:12

## 2024-03-20 RX ADMIN — QUETIAPINE FUMARATE 275 MG: 200 TABLET ORAL at 20:35

## 2024-03-20 RX ADMIN — BUSPIRONE HYDROCHLORIDE 5 MG: 5 TABLET ORAL at 14:48

## 2024-03-20 RX ADMIN — DOCUSATE SODIUM 100 MG: 100 CAPSULE, LIQUID FILLED ORAL at 08:12

## 2024-03-20 RX ADMIN — DOCUSATE SODIUM 100 MG: 100 CAPSULE, LIQUID FILLED ORAL at 20:35

## 2024-03-20 ASSESSMENT — PAIN SCALES - GENERAL
PAINLEVEL_OUTOF10: 0 - NO PAIN
PAINLEVEL_OUTOF10: 0 - NO PAIN

## 2024-03-20 ASSESSMENT — PAIN - FUNCTIONAL ASSESSMENT
PAIN_FUNCTIONAL_ASSESSMENT: 0-10
PAIN_FUNCTIONAL_ASSESSMENT: 0-10

## 2024-03-20 NOTE — CARE PLAN
"Patient out on the unit and social with peers this shift. Pt attended groups throughout shift. Rated anxiety 6-7/10 and depression 3/10. Denied SI/HI. Denied auditory/visual/other hallucinations. No complaints of pain or discomfort. Medication compliant. Able to state positive coping skills such as \"deep breathing, coloring and music\". The patient's goals for the shift include \"I want to get the fuck out of here and live my life with my wife\". Q15 minute checks to be maintained throughout shift for safety.     "

## 2024-03-20 NOTE — NURSING NOTE
Pt too her medications  She finished coloring and eating her snack  and went to bed  Pt slept all night long  Pt had an uneventful night

## 2024-03-20 NOTE — PROGRESS NOTES
"Occupational Therapy     REHAB Therapy Assessment & Treatment    Patient Name: Robert Momin  MRN: [id number]  Today's Date: 3/20/2024      Activity Assessment:      Gratitude Importance and Coping Skill Use Group: [id number]  Creative Art and Positive Mindset Carry Over Group: [id number]  Problem Solving/Impulse Control and Leisure Skills Group: [id number]    3/3 Groups attended     Pt present and engages well throughout all groups with continued G attention to task as well as less intrusive behaviors and over sharing observed this date as compared to previous groups. Pt murali's G understanding of gratitude education as pt able to share what being grateful means in her own words with G confidence. Pt then able to create a personal list of statements of gratitude to utilize for future coping as well as pt requesting \"more pieces of paper because I have a ton to be grateful for\" During problem solving task, pt continues to demo G initiation and sharing during activity as well as murali's G understanding of how leisure skills and meaningful interaction is important for home carry over. Pt continues to make small improvements each date toward OT goals as evidenced above. Pt would benefit from continued OT services in order to improve overall self-esteem, personal confidence and supports with increased awareness for safe transition location at discharge.          Encounter Problems       Encounter Problems (Active)       OT Goals       Pt will ID 3 strategies to manage symptoms of grief to improve daily function and QoL (Progressing)       Start:  03/14/24    Expected End:  04/04/24            Pt will improve ability to ID and express emotions while accepting and tolerating all emotions, in order to increase awareness of positive emotions.  (Progressing)       Start:  03/14/24    Expected End:  04/04/24            Pt will show increased reality based behavior in OT groups or 1:1 interactions prior to discharge 100% of the " time.  (Progressing)       Start:  03/14/24    Expected End:  04/04/24            Pt will explore and ID 1-2 strategies to manage stressors/symptoms of illness/ grief more effectively prior to discharge (Progressing)       Start:  03/14/24    Expected End:  04/04/24            Pt will ID 2 community resources/programs to join/attend after D/C to improve their support system (Progressing)       Start:  03/14/24    Expected End:  04/04/24                       Additional Comments:  BRAVO collaborated with patients nurse and charge nurse throughout the day to provide the appropriate support and encouragement to attend groups. Pt up on unit when BRAVO left last group of the day. All needs met.

## 2024-03-20 NOTE — PROGRESS NOTES
"Robert Momin is a 48 y.o. year old female patient who is on [facility] admission day 6.      Subjective   Robert Momin is a 48 y.o. year old female patient who was personally seen and interviewed, and discussed in morning team rounds. The patient was interviewed alone at the end of the hallway (interviewed sitting in a chair), and was easily engaged and cooperative. This morning, Robert reports feeling \"anxious\" and currently rates her depression at a 3 out of 10. No suicidal ideation or plans were elicited. She also rates her anxiety at a \"10 plus\" out of 10. Robert continues to endorse auditory hallucinations of hearing her \"wife\", but denies visual hallucinations. She also reports ongoing delusions of reference from [employer] news on the TV in the [facility] telling her about he \"wife\". Robert states that she really misses her family and her own belongings and home. Robert slept 8.5 hours last night (unbroken).           Objective   Mental Status Exam:   General: Appropriately groomed and dressed in casual attire.   Appearance: Appears stated age.   Attitude: Calm, cooperative.   Behavior: Appropriate eye contact.   Motor Activity: No agitation or retardation. No EPS/TD.  Normal gait and station. Normal muscle tone and bulk.   Speech: Regular rate, rhythm, volume and tone, spontaneous,  fluent. Non-pressured.   Mood: \"anxious\"   Affect: Neutral.   Thought Process: Organized, linear, goal directed.   Thought Content: Does not endorse suicidal ideation or any suicide plans.   Does not endorse homicidal ideation.  Endorses delusions of reference.   Thought Perception: Does endorse auditory hallucinations, does not appear to be responding to hallucinatory stimuli.   Cognition: Alert, oriented x 3. No deficits noted.  Adequate fund of knowledge. No deficit in recent and remote memory. No deficits in attention, concentration or language.   Insight: Poor, as patient does not recognize symptoms of  illness and need for " recommended treatments.    Judgment: Poor-to-fair, as patient cannot always make reasonable decisions about ordinary activities of daily living and necessary medical care recommendations.       LABS:  No results found for this or any previous visit (from the past 24 hour(s)).     Last Recorded Vitals  Visit Vitals  BP (!) 127/91 (BP Location: Right arm, Patient Position: Sitting)   Pulse 97   Temp 36 °C (96.8 °F)   Resp 18        Intake/Output last 3 Shifts:  No intake/output data recorded.    Relevant Results  Scheduled medications  busPIRone, 5 mg, oral, TID  docusate sodium, 100 mg, oral, BID  QUEtiapine, 225 mg, oral, Nightly      Continuous medications     PRN medications  PRN medications: acetaminophen, alum-mag hydroxide-simeth, diphenhydrAMINE **OR** diphenhydrAMINE, haloperidol **OR** haloperidol lactate, hydrOXYzine pamoate, LORazepam **OR** LORazepam, melatonin, psyllium, QUEtiapine           Assessment/Plan   1) Bipolar Disorder, type I, currently manic, severe with psychosis (AH, paranoia)       Plan: *1) Increase Seroquel 150 ->175 -> 200 -> 225 -> 275 mg at bedtime.                *2) Continue Buspar 5 mg TiD (for anxiety)                 3) Group and Milieu therapy  Discussed potential risks, benefits, and alternatives to medications with patient, who consented to the above medications.     2) Panic Disorder without Agoraphobia, with moderate panic attacks       Plan: 1) See above     3) Other specified anxiety       Plan: 1) See above     4) Hypertension       Plan: 1) IM service to follow and manage      5) HLD       Plan: 1) IM service to follow and manage      6) GERD       Plan: 1) IM service to follow and manage      7) Pre-DM       Plan: 1) IM service to follow and manage    LEVY Bright-Student

## 2024-03-20 NOTE — GROUP NOTE
"Group Topic: Leisure Skills   Group Date: 3/20/2024  Start Time: 1600  End Time: 1700  Facilitators: MICHELET Resendez   Department: [place] [facility]    Number of Participants: 7   Group Focus: coping skills, leisure skills, self-awareness, and self-esteem  Treatment Modality: Other: Recreational Therapy   Interventions utilized were exploration, leisure development, patient education, and support  Purpose: coping skills, leisure awareness/education, social engagement     Name: Robert Momin YOB: 1975   MR: [id number]      Facilitator: Recreational Therapist  Level of Participation: active  Quality of Participation: appropriate/pleasant, cooperative, and engaged  Interactions with others: appropriate, supportive, asked thoughtful questions, and offered helpful suggestions  Mood/Affect: appropriate and positive  Triggers (if applicable): n/a  Cognition: coherent/clear  Progress: Moderate  Comments: Patients were provided with the “Leisure, Are You Complete?” handout. We discussed the importance of having a variety of leisure interests to be well-rounded in our leisure time. The areas included (improvement, pleasure, socialization, identification, escape, creativity, consumption, spiritual, and fitness). Patients were encouraged to record up to three leisure activities in each area. Patients were also asked to identify their two strongest areas and two areas they could improve upon.     Pt was calm, cooperative, pleasant, and fully engaged in group task/discussion. She was able to identify several leisure interests in each key area including \"music\", \"reading\" and \"coloring\". She asked relevant questions and showed good insight throughout conversation.     Plan: continue with services      "

## 2024-03-20 NOTE — NURSING NOTE
"Pt interview in the front lounge  Pt is calm and cooperative at this time  Pt is sitting coloring and eating her snack  Pt rated her anxiety 0.10  depression 0/10 pt denied everything else at this time  Pt stated her goal \"sleep good\"  her strength \"Im loyal\" and her coping skill \"I deep breath\"  Pt is appropriate with her answers to the questions at this time   "

## 2024-03-20 NOTE — GROUP NOTE
Group Topic: Gross Motor/Balance Skills   Group Date: 3/20/2024  Start Time: 1400  End Time: 1500  Facilitators: MICHELET Resendez; MICHELET Rodriguez   Department: [facility]    Number of Participants: 8   Group Focus: leisure skills and other physical leisure  Treatment Modality: Recreation Therapy  Interventions utilized were leisure development, physical activity (corkaine)   Purpose: coping skills, leisure awareness, movement, and social stimulation    Name: Robert Momin YOB: 1975   MR: [id number]      Facilitator: Recreational Therapist  Level of Participation: active  Quality of Participation: appropriate/pleasant, cooperative, and distractible  Interactions with others: appropriate  Mood/Affect: appropriate and brightens with interaction  Triggers (if applicable): N/A  Cognition: coherent/clear  Progress: Moderate  Comments: Group participants were introduced to a physical leisure activity called “Corkaine”. The patients were split into two teams and asked to over or underhand toss objects towards a target. All participants were informed of the rules and how to play. Throughout the session patients were encouraged to complete movement/coordination tasks, were provided social stimulation, and offered a pleasurable leisure activity.      Patient attended the entire session and completed all desired group tasks.     Plan: continue with services

## 2024-03-21 PROCEDURE — 1240000001 HC SEMI-PRIVATE BH ROOM DAILY

## 2024-03-21 PROCEDURE — 99232 SBSQ HOSP IP/OBS MODERATE 35: CPT | Performed by: PSYCHIATRY & NEUROLOGY

## 2024-03-21 PROCEDURE — 97150 GROUP THERAPEUTIC PROCEDURES: CPT | Mod: GO

## 2024-03-21 PROCEDURE — 2500000002 HC RX 250 W HCPCS SELF ADMINISTERED DRUGS (ALT 637 FOR MEDICARE OP, ALT 636 FOR OP/ED): Performed by: PSYCHIATRY & NEUROLOGY

## 2024-03-21 PROCEDURE — 2500000001 HC RX 250 WO HCPCS SELF ADMINISTERED DRUGS (ALT 637 FOR MEDICARE OP): Performed by: PSYCHIATRY & NEUROLOGY

## 2024-03-21 RX ORDER — QUETIAPINE FUMARATE 300 MG/1
300 TABLET, FILM COATED ORAL NIGHTLY
Status: DISCONTINUED | OUTPATIENT
Start: 2024-03-21 | End: 2024-03-22 | Stop reason: HOSPADM

## 2024-03-21 RX ADMIN — BUSPIRONE HYDROCHLORIDE 5 MG: 5 TABLET ORAL at 20:42

## 2024-03-21 RX ADMIN — DOCUSATE SODIUM 100 MG: 100 CAPSULE, LIQUID FILLED ORAL at 09:11

## 2024-03-21 RX ADMIN — DOCUSATE SODIUM 100 MG: 100 CAPSULE, LIQUID FILLED ORAL at 20:42

## 2024-03-21 RX ADMIN — BUSPIRONE HYDROCHLORIDE 5 MG: 5 TABLET ORAL at 09:11

## 2024-03-21 RX ADMIN — QUETIAPINE FUMARATE 300 MG: 300 TABLET ORAL at 20:42

## 2024-03-21 RX ADMIN — ACETAMINOPHEN 650 MG: 325 TABLET ORAL at 00:35

## 2024-03-21 RX ADMIN — ACETAMINOPHEN 650 MG: 325 TABLET ORAL at 11:55

## 2024-03-21 RX ADMIN — BUSPIRONE HYDROCHLORIDE 5 MG: 5 TABLET ORAL at 14:33

## 2024-03-21 ASSESSMENT — PAIN SCALES - PAIN ASSESSMENT IN ADVANCED DEMENTIA (PAINAD)
CONSOLABILITY: NO NEED TO CONSOLE
FACIALEXPRESSION: SMILING OR INEXPRESSIVE
TOTALSCORE: 0
BODYLANGUAGE: RELAXED
BREATHING: NORMAL

## 2024-03-21 ASSESSMENT — PAIN SCALES - WONG BAKER
WONGBAKER_NUMERICALRESPONSE: NO HURT
WONGBAKER_NUMERICALRESPONSE: HURTS LITTLE BIT

## 2024-03-21 ASSESSMENT — PAIN - FUNCTIONAL ASSESSMENT
PAIN_FUNCTIONAL_ASSESSMENT: 0-10
PAIN_FUNCTIONAL_ASSESSMENT: 0-10

## 2024-03-21 ASSESSMENT — PAIN SCALES - GENERAL
PAINLEVEL_OUTOF10: 0 - NO PAIN
PAINLEVEL_OUTOF10: 4
PAINLEVEL_OUTOF10: 3
PAINLEVEL_OUTOF10: 1

## 2024-03-21 ASSESSMENT — PAIN DESCRIPTION - LOCATION: LOCATION: TEETH

## 2024-03-21 ASSESSMENT — PAIN DESCRIPTION - ORIENTATION: ORIENTATION: LEFT

## 2024-03-21 NOTE — PROGRESS NOTES
Occupational Therapy     REHAB Therapy Assessment & Treatment    Patient Name: Robert Momin  MRN: [id number]  Today's Date: 3/21/2024    Attendance:  Attendance  Activity:  (Let-it-go stressor [id number] // Identifying coping skills A-Z: [id number])  Participation: Active participation    Therapeutic Recreation:  Treatment Approach  Approach : Group therapy sessions    2/2 groups attended     Pt presents and engages purposefully in the above groups. Pt able to identify different areas of stress, several unique coping strategies, and how to incorporate strategies into identified problem areas. Pt was pleasant/cooperative, was maximally interactive with peers without prompts, and asked/answered questions appropriately. Overall, pt with good progress toward OT goals and would benefit from continued OT services in order to maximize general outcomes after discharge.    Encounter Problems       Encounter Problems (Active)       OT Goals       Pt will ID 3 strategies to manage symptoms of grief to improve daily function and QoL (Progressing)       Start:  24    Expected End:  24            Pt will improve ability to ID and express emotions while accepting and tolerating all emotions, in order to increase awareness of positive emotions.  (Progressing)       Start:  24    Expected End:  24            Pt will show increased reality based behavior in OT groups or 1:1 interactions prior to discharge 100% of the time.  (Progressing)       Start:  24    Expected End:  24            Pt will explore and ID 1-2 strategies to manage stressors/symptoms of illness/ grief more effectively prior to discharge (Progressing)       Start:  24    Expected End:  24            Pt will ID 2 community resources/programs to join/attend after D/C to improve their support system (Progressing)       Start:  24    Expected End:  24                     Education Documentation  No  documentation found.  Education Comments  No comments found.

## 2024-03-21 NOTE — PROGRESS NOTES
"Robert Momin is a 48 y.o. year old female patient who is on [facility] admission day 7.      Subjective   Robert Momin is a 48 y.o. year old female patient who was personally seen and interviewed, and discussed in morning team rounds. The patient was interviewed alone at the end of the hallway (interviewed sitting in a chair), and was easily engaged and cooperative. This morning, Robert reports feeling \"okay\" and currently rates her depression at a 3 out of 10. No suicidal ideation or plans were elicited. She also rates her anxiety at a 3 out of 10. She does report that she is feeling a little better than yesterday. Robert continues to endorse auditory hallucinations of hearing her \"wife\", but denies visual hallucinations. She also reports ongoing delusions of reference from the TV in the lounge telling her about he \"wife\". Robert slept 7.5 hours last night (unbroken) although she did report difficulty falling asleep due to her anxiety.              Objective   Mental Status Exam:   General: Appropriately groomed and dressed in casual attire.   Appearance: Appears stated age.   Attitude: Calm, cooperative.   Behavior: Appropriate eye contact.   Motor Activity: No agitation or retardation. No EPS/TD.  Normal gait and station. Normal muscle tone and bulk.   Speech: Regular rate, rhythm, volume and tone, spontaneous,  fluent. Non-pressured.   Mood: \"okay\"   Affect: mildly labile (anxious and then laughing)   Thought Process: Organized, linear, goal directed.   Thought Content: Does not endorse suicidal ideation or any suicide plans.   Does not endorse homicidal ideation.  Endorses delusions of reference.    Thought Perception: Does endorse auditory hallucinations, denies visual hallucinations, does not appear to be responding to hallucinatory stimuli.   Cognition: Alert, oriented x 3. No deficits noted.  Adequate fund of knowledge. No deficit in recent and remote memory. No deficits in attention, concentration or " language.   Insight: Poor, as patient does not recognize symptoms of  illness and need for recommended treatments.    Judgment: Poor-to-fair, as patient can make some reasonable decisions about ordinary activities of daily living and necessary medical care recommendations.       LABS:  No results found for this or any previous visit (from the past 24 hour(s)).     Last Recorded Vitals  Visit Vitals  BP (!) 146/92 (BP Location: Left arm, Patient Position: Sitting)   Pulse 99   Temp 36.1 °C (97 °F) (Temporal)   Resp 18        Intake/Output last 3 Shifts:  No intake/output data recorded.    Relevant Results  Scheduled medications  busPIRone, 5 mg, oral, TID  docusate sodium, 100 mg, oral, BID  QUEtiapine, 275 mg, oral, Nightly      Continuous medications     PRN medications  PRN medications: acetaminophen, alum-mag hydroxide-simeth, diphenhydrAMINE **OR** diphenhydrAMINE, haloperidol **OR** haloperidol lactate, hydrOXYzine pamoate, LORazepam **OR** LORazepam, melatonin, psyllium, QUEtiapine               Assessment/Plan   1) Bipolar Disorder, type I, currently manic, severe with psychosis (AH, paranoia)       Plan: *1) Increase Seroquel 150 ->175 -> 200 -> 225 -> 275  -> 300 mg at bedtime.                *2) Continue Buspar 5 mg TiD (for anxiety)                 3) Group and Milieu therapy  Discussed potential risks, benefits, and alternatives to medications with patient, who consented to the above medications.     2) Panic Disorder without Agoraphobia, with moderate panic attacks       Plan: 1) See above     3) Other specified anxiety       Plan: 1) See above     4) Hypertension       Plan: 1) IM service to follow and manage      5) HLD       Plan: 1) IM service to follow and manage      6) GERD       Plan: 1) IM service to follow and manage      7) Pre-DM       Plan: 1) IM service to follow and manage     LEVY Bright-Student

## 2024-03-21 NOTE — GROUP NOTE
Group Topic: Cognitive Focus   Group Date: 3/21/2024  Start Time: 1400  End Time: 1445  Facilitators: EVERETT FariaS   Department: [facility]    Number of Participants: 10   Group Focus: problem solving and social skills  Treatment Modality: Recreational Therapy  Interventions utilized were mental fitness and problem solving  Purpose: coping skills and communication skills    Name: Robert Momin YOB: 1975   MR: [id number]      Facilitator: Recreational Therapist  Level of Participation: moderate  Quality of Participation: appropriate/pleasant and cooperative  Interactions with others: appropriate  Mood/Affect: appropriate and flat  Triggers (if applicable): n/a  Cognition: coherent/clear  Progress: Moderate  Comments: Patients engaged in an intellectually enriching LINKEE session. This recreational pursuit honed participants cognitive agility and verbal associations while encouraging teamwork and collaboration. The strategic interplay of word connections in LINKEE served as a cognitive stimulant, amplifying both mental dexterity and interpersonal bonds, enhancing the overall therapeutic experience for participants involved. Pt attentive and participative throughout group.   Plan: continue with services

## 2024-03-21 NOTE — NURSING NOTE
"Pt interview in the front [facility]  Pt is watching TV and coloring    Pt rated her anxiety 6/10  depression 6/10 and denied everything else  Pt stated her goal \"get sleep\"  her strength\"I like to color\"  and her coping skill \"I deep breath\"   Pt is appropriate with her answers to the  questions at this time  "

## 2024-03-21 NOTE — CARE PLAN
Phoned son Trace, who stated patient has been acting aggressive over the phone when she calls him from the hospital; states she hangs up on him when she is told no or limits are set for her;  he stated that, at baseline, she is emotional but not nearly to this extreme.  He is worried about her coming home, stated she is not stable yet. Told Trace possible discharge Friday, but maybe Monday;  Informed attending of the same.  No firm discharge date yet.  Sw to follow.

## 2024-03-21 NOTE — NURSING NOTE
"The pt was calm and cooperative during the interview that took place in the [facility] per the pts request. Pt rated her anxiety and depression both 4/10, denies SI and HI. Pt stated, \"I don't know if you would call it hallucinations but my usual voice.\" Pt rated her pain a 1/10 verbalized as a toothache.  Coping skills, \"coloring, I've been coloring a lot and actually finishing some pages.\" Goal for the day, \"figure out what the hell is going on.\" Pt strength, \"I have a lot let me think of the one I want to use today. I'll go with friendly.\" The pt was medication compliant with her morning medications. Q 15 minute monitoring continued.   "

## 2024-03-21 NOTE — CARE PLAN
"  Problem: Sensory Perceptual Alteration as Evidenced by  Goal: Participates in unit activities  Outcome: Progressing     Problem: Ineffective Coping  Goal: Identifies healthy coping skills  Outcome: Progressing     Problem: Risk for Suicide  Goal: Accepts medications as prescribed/needed this shift  Outcome: Progressing   The patient's goals for the shift include \"figure out what the hell is going on\"    The clinical goals for the shift include maintain safety    Over the shift, the patient did make progress toward the following goals. The pt had an uneventful day. The pt was medication compliant with her medications. The pt's son visited today and brought in clothing that was added to her belongings sheet. Q 15 minute monitoring continued.     "

## 2024-03-21 NOTE — NURSING NOTE
Pt took her night time medications She ate her snack and finished coloring and went to bed  Pt had an uneventful night Pt slept all night long

## 2024-03-21 NOTE — GROUP NOTE
Group Topic: Pet Therapy   Group Date: 3/21/2024  Start Time: 1100  End Time: 1145  Facilitators: EVERETT FariaS; EVERETT RodriguezS   Department: [facility] REHAB THERAPY VIRTUAL    Number of Participants: 10   Group Focus: coping skills and social skills  Treatment Modality: Recreational Therapy  Interventions utilized were Pet therapy, social stimulation  Purpose: coping skills    Name: Robert Momin YOB: 1975   MR: [id number]      Facilitator: Recreational Therapist  Level of Participation: active  Quality of Participation: appropriate/pleasant  Interactions with others: appropriate  Mood/Affect: appropriate and slightly tearful  Triggers (if applicable): N/A  Cognition: coherent/clear  Progress: Moderate  Comments: This session involved [facility] including a volunteer and a pet therapy dog. They visited in the unit in the dining area. All participants were encouraged to ask questions and engage with the volunteer/dog. The therapy dog and handler performed tasks/tricks and provided education on pet therapy. Patients were encouraged to share stories about personal pets or if working with therapeutic animals.       Patient attended the entire session. She was calm and cooperative. She did appear tearful at times, but continued to engage with the therapy animal. Patient did not appear upset or bothered and was happy with the interaction/activity.     Plan: continue with services

## 2024-03-22 VITALS
HEART RATE: 120 BPM | RESPIRATION RATE: 18 BRPM | BODY MASS INDEX: 37.75 KG/M2 | WEIGHT: 240.52 LBS | TEMPERATURE: 97 F | HEIGHT: 67 IN | DIASTOLIC BLOOD PRESSURE: 85 MMHG | SYSTOLIC BLOOD PRESSURE: 144 MMHG | OXYGEN SATURATION: 97 %

## 2024-03-22 PROBLEM — F23 ACUTE PSYCHOSIS (MULTI): Status: RESOLVED | Noted: 2024-03-13 | Resolved: 2024-03-22

## 2024-03-22 PROCEDURE — 99239 HOSP IP/OBS DSCHRG MGMT >30: CPT | Performed by: PSYCHIATRY & NEUROLOGY

## 2024-03-22 PROCEDURE — 2500000001 HC RX 250 WO HCPCS SELF ADMINISTERED DRUGS (ALT 637 FOR MEDICARE OP): Performed by: PSYCHIATRY & NEUROLOGY

## 2024-03-22 PROCEDURE — 97150 GROUP THERAPEUTIC PROCEDURES: CPT | Mod: GO,CO

## 2024-03-22 RX ORDER — QUETIAPINE FUMARATE 300 MG/1
300 TABLET, FILM COATED ORAL NIGHTLY
Qty: 30 TABLET | Refills: 0 | Status: SHIPPED | OUTPATIENT
Start: 2024-03-22 | End: 2024-04-21

## 2024-03-22 RX ORDER — BUSPIRONE HYDROCHLORIDE 5 MG/1
5 TABLET ORAL 3 TIMES DAILY
Qty: 90 TABLET | Refills: 0 | Status: SHIPPED | OUTPATIENT
Start: 2024-03-22 | End: 2024-04-21

## 2024-03-22 RX ADMIN — BUSPIRONE HYDROCHLORIDE 5 MG: 5 TABLET ORAL at 08:02

## 2024-03-22 RX ADMIN — DOCUSATE SODIUM 100 MG: 100 CAPSULE, LIQUID FILLED ORAL at 08:02

## 2024-03-22 ASSESSMENT — PAIN SCALES - GENERAL: PAINLEVEL_OUTOF10: 0 - NO PAIN

## 2024-03-22 ASSESSMENT — PAIN - FUNCTIONAL ASSESSMENT: PAIN_FUNCTIONAL_ASSESSMENT: 0-10

## 2024-03-22 ASSESSMENT — PAIN SCALES - WONG BAKER: WONGBAKER_NUMERICALRESPONSE: NO HURT

## 2024-03-22 NOTE — DISCHARGE SUMMARY
"Admission Date: 3-  Discharge Date: 3-      Reason For Admission: \"Anxiety\"       Discharge Diagnosis  1) Bipolar Disorder, type I, currently manic, severe with psychosis (AH, paranoia)   2) Panic Disorder without Agoraphobia, with moderate panic attacks   3) Other specified anxiety disorder  4) Hypertension   5) HLD  6) GERD  7) Pre-diabetes        Initial Admission :   Robert Momin is a 48 y.o. year old female patient presents from [place] ED for worsening delusions and psychosis in the last week. Robert currently reports experiencing at least 1 week of an abnormally elevated expansive mood, along with decreased sleep (only 2-3 hours per night) and decreased appetite, grandiose behavior, and increased goal-directed behavior. For the past week, Robert reports believing that she is going to get back together with her girlfriend who broke up with her three years ago. She believes that everyone around the world has connected across the internet and through telephones to surprise her with the news that she and her ex are being reunited. Prior to ED admission it was reported that Robert was also having pressured speech, racing thoughts, and increased psychomotor agitation (pacing). Robert also reports hallucinations of hearing her ex-girlfriend's voice and paranoia about there being cameras and everybody listening to her.      Robert reports experiencing random episodes, lasting about 5-10 minutes, consisting of a racing heart rate, shortness of breath, chest pain, diaphoresis, dizziness, and an impending feeling of doom. These episodes occur about once per month on average, since she was a teenager.     Robert reports experiencing excessive worries about everyday activities that she can't control, and result in problems sleeping, concentrating, decreased energy, irritability, and restlessness for the last week.      Per Psych Consult Assessment of 3-:  Robert Momin is a 48 y.o. female, hx of " panic attacks, presenting to ED for worsening delusions and psychosis in the past week.     Per ED provider note:  History of present illness this patient states that she feels as if something is drastically wrong with her. The patient was seen at [facility] last night and again this morning because she wants to be admitted to a healthcare facility for psychiatric stabilization. This patient lives with her son and the son's girlfriend. The patient states that she has been extremely depressed and has been hearing voices and having delusions. The patient states that she thinks the cell phone is creating waves that create movements of people around her that influence how the patient is behaving. Patient also states that there are cameras checking her from many angles including the cameras on the phones. The patient also feels as if someone is using her eyeballs to control her movements and thoughts. She states she is very paranoid and has been having hallucinations hearing the voices of her ex-girlfriend quite frequently. None of the voices are threatening. The patient states she is not suicidal or homicidal but she has been accusing her son of trying to hurt her and manipulate her. The son says the patient is not eating or drinking she has been very agitated wandering through the house saying unusual things that do not make any sense and feels like this patient is very unstable psychiatrically. This patient states that she has been in a prior relationship with her girlfriend who has had communication via the cell phone and other forms of communication with her and this is upsetting to the patient however the patient admits that she wants this girlfriend to come back and be back in relationship with her. The patient does have a history of depression she had been on Zoloft in the past she has not been diagnosed with bipolar disorder or schizophrenia she does have a strong family history of psychiatric problems including  "bipolar disorder.      On interview with pt, she reports she is here today because she has been \"bottling\" emotions up and today reaching a breaking point. Ex-gf \"is my addiction\", and broke up with pt in July 2020; since then pt intermittently develops hope of getting back together again and in those moments feel, \"consumed\" and \"delusional\", for example hearing her voice while knowing she is not there as ex lives in [place]. Recent trigger was pt reaching back out to ex-gf in July 2023. Pt endorses feeling that there are cameras in her home which are controlling her movements. Reporting average 6 hours of sleep in the last 3 nights, barely eating, racing thoughts, auditory hallucinations, increased impulsivity; denies high-risk behaviors or rapid speech. Denies any suicidal or homicidal ideations.      Collateral from son: Son reports pt has not been herself in the last several days (5-6 days ago)  with paranoid delusions (cameras in the house, fam hiding something from pt, \"you guys think you're slick but you're not really slick\"), calling son by ex-wife's name last night. Son reports this is the first time he has witnessed pt experiencing these sxs.       Hospital Course:       Following admission to the [facility] at [facility], Robert was started on Seroquel 300 mg QHS to help treat her manic and psychotic symptoms. She was also prescribed Buspar 5 mg TiD to help with anxiety.  Robert also attended unit groups to help with coping skills, stating \"I loved them, especially the pet therapy.\" At the time of discharge, Robert denied experiencing any visual hallucinations, paranoia, significant anxiety, or symptoms of Major Depressive Disorder. However, Robert did endorse that her mood was still \"better than normal\" along with some increased energy and increased self-esteem. She also reported experiencing auditory hallucinations saying \"very good things\" about her (ex) wife, and delusions of reference from the TV " "talking directly to her \"about my wife\" (again in a positive way). Her manic symptoms have improved since admission.               Mental Status Exam:   General: Appropriately groomed and dressed in casual attire.   Appearance: Appears stated age.   Attitude: Calm, cooperative.   Behavior: Appropriate eye contact.   Motor Activity: No agitation or retardation. No EPS/TD. Normal gait and station. Normal muscle tone and bulk.   Speech: Regular rate, rhythm, volume and tone, spontaneous,  fluent. Non-pressured.   Mood: \"Pretty good\"   Affect: Slightly labile (pleasant to slightly irritable).   Thought Process: Organized, and goal directed.   Thought Content: Does not endorse suicidal ideation or any suicide plans.   Does not endorse homicidal ideation.  No overt paranoia elicited.  +Delusions of Reference (reports TV is talking to her directly about her wife)   Thought Perception: Does endorse ongoing auditory hallucinations saying \"very good things\" about her wife.  No visual hallucinations, and does not appear to be responding to hallucinatory stimuli.   Cognition: Alert, oriented x 3. No deficits noted.  Adequate fund of knowledge. No deficit in recent and remote memory. No deficits in attention, concentration or language.   Insight: Fair, as patient recognizes symptoms of  illness and need for recommended treatments.    Judgment: Intact, as patient can make reasonable decisions about ordinary activities of daily living and necessary medical care recommendations.            Risk Assessment at Discharge:  Robert is judged a minimal suicide risk due to: 1) No guns at home, 2) Only one prior suicide attempt in 5th grade by an overdose attempt, 3) Denies any current suicidal ideation or suicide plans, 4) +plans for the future: \"... Going to my best friend's birthday this weekend...,\" and 5) No symptoms of Major Depressive Disorder elicited at discharge.            Discharge Medications:  Scheduled " medications  busPIRone, 5 mg, oral, TID  docusate sodium, 100 mg, oral, BID  QUEtiapine, 300 mg, oral, Nightly      Continuous medications     PRN medications  PRN medications: acetaminophen, alum-mag hydroxide-simeth, diphenhydrAMINE **OR** diphenhydrAMINE, haloperidol **OR** haloperidol lactate, hydrOXYzine pamoate, LORazepam **OR** LORazepam, melatonin, psyllium, QUEtiapine         I spent over 30 minutes in the preparation of this summary. All 11 elements of the transition record were discussed with the patient and or caregiver and the receiving inpatient facility (if applicable).  A copy of the transition record was given to the patient and was transmitted to the outpatient provider accepting the patient's care following  discharge.    Patient's illness, medication side effects, benefits and risk were reviewed with the patient prior to discharge. The patient voiced understanding of their diagnosis, the medications recommended along with the importance of mediation compliance.  The patient was counseled not to stop medications without the supervision of a psychiatrist.  The patient was counseled to follow-up with their outpatient medical provider as indicated. The patient was counseled that if there was an increase in mental health issues, depression, anxiety, medication side effects, self harming thoughts or thoughts to harm others, to call [facility] or 911 and come to the nearest emergency room. The patient also received information regarding advanced mental and medical health directives during this hospitalization which they could discuss with their outpatient provider. The plan was discussed with the patient, the nurse and the social work department. The patient voiced understanding and agreement with the  plan.  ------------------------------------------------------------------------------------------------------------------------------------------------------------------------------------------------------  Substance Use Risk Assessment:    Nicotine: Risks of continued tobacco use was addressed with the patient which included: inpatient education and counseling of the risks of oral, esophageal as well as other organ cancers (including oral, dermatological, gastric, pancreatic, respiratory) along with the ongoing risk of neurological and cardiovascular disease/events (strokes, angina). Treatment options for cessation were offered to include: alternate tobacco products, both inpatient/outpatient counseling. Replacement products were offered during this admission and prescribed at the time of discharge along with a referral to outpatient cessation counseling.    Alcohol: The increase in morbidity and mortality, financial, both interpersonal and physical health risk in direct relationship to the use of alcohol ( in either a binge pattern or a sustained use over time) was discussed with the patient. Risks of intoxication, disinhibition, legal and interpersonal issues as well as abuse and dependence, along with the    increased risks of organ damage (cardiac, neurological, esophageal, gastric, liver, pancreatic, renal dysfunction among others) was discussed. The risks of decreased hepatic clearance and increased medication serum drug levels along with increase in potential medication side effects, was also discussed.   Options for treatment: Discussed was reduction in alcohol consumption, referral to dual diagnosis program, residential rehabilitation programs, AA, NA, ADELIA, gabapentin and oral naltrexone, if meets criteria as a candidate for these medications.    Street Drugs: Street drug use was addressed on admission, including both physical, mental, financial and psychological risk factors of ongoing use. There are no  FDA prescribed treatment medications for cannabis, stimulants use/abuse (cocaine, PCP) or hallucinogens.  Patient was screened for concomitant other drugs used (tobacco, alcohol). Treatment options available were discussed ( if applicable) AA, LÓPEZ, ADELIA, and outpatient dual diagnosis therapy, treatment programs. Patient voiced understanding of their treatment options.          Plan:  1) Continue current medications as prescribed at discharge.  2) Follow-up:       Intake Assessment Appointment: for psychiatry, counseling and case management services     On Wednesday, March 27th, 2024 at 9:30 PM.     [facility] Office,  [address] [place] [address].  [place], [place], [address]     P. [phone];  F. [phone]             Florentino Kaplan MD

## 2024-03-22 NOTE — NURSING NOTE
Pt took her night time medications  Pt finished coloring  her picture and went to bed  Pt had an uneventful night

## 2024-03-22 NOTE — NURSING NOTE
This nurse reviewed the pt's discharge paperwork with the pt. This nurse enforced the importance of attending her follow up appointments as that is where she will receive her refill medications. The pt's appointment was written as 9:30 PM this nurse called [facility] to verify the appointment and it is 0930 AM. This nurse did inform the pt of the correct appointment time and she verbalized understanding. The pt left the floor at 1140.

## 2024-03-22 NOTE — CARE PLAN
"The patient's goals for the shift include \"get sleep\"    The clinical goals for the shift include medication compliance    Over the shift, the patient did not make progress toward the following goals. Barriers to progression include lack of medication knowledge. Recommendations to address these barriers include more education on medicatons.    "

## 2024-03-22 NOTE — PROGRESS NOTES
"Occupational Therapy     REHAB Therapy Assessment & Treatment    Patient Name: Robert Momin  MRN: [id number]  Today's Date: 3/22/2024      Activity Assessment:   Decision Making/ Meaningful Sharing (LRC with Questions) Group: [id number]  Leisure Skills/ Open Recreation (Jerson Flip) Group: [phone]    1/2 Groups attended     Pt present and engages well in decision making group only as pt prepping for discharge during the 2nd group. During attended group, pt with improved social interaction and social awareness with peers as compared to previous groups, pt with G understanding of meaningful sharing reasons as well as able to share X1 inspiration aloud with G confidence stating \"my son, he has inspired me so much\" Pt provides appropriate feedback and validation to peers as they share and overall demo's much improvement toward OT goals as evidenced above. Pt would benefit from continued OT services in order to improve overall self-esteem, personal confidence and supports with increased awareness for safe transition location at discharge.        Encounter Problems       Encounter Problems (Active)       OT Goals       Pt will ID 3 strategies to manage symptoms of grief to improve daily function and QoL (Progressing)       Start:  03/14/24    Expected End:  04/04/24            Pt will improve ability to ID and express emotions while accepting and tolerating all emotions, in order to increase awareness of positive emotions.  (Progressing)       Start:  03/14/24    Expected End:  04/04/24            Pt will show increased reality based behavior in OT groups or 1:1 interactions prior to discharge 100% of the time.  (Progressing)       Start:  03/14/24    Expected End:  04/04/24            Pt will explore and ID 1-2 strategies to manage stressors/symptoms of illness/ grief more effectively prior to discharge (Progressing)       Start:  03/14/24    Expected End:  04/04/24            Pt will ID 2 community resources/programs to " join/attend after D/C to improve their support system (Progressing)       Start:  03/14/24    Expected End:  04/04/24                     Additional Comments:    BRAVO collaborated with patients nurse and charge nurse throughout the day to provide the appropriate support and encouragement to attend groups. Pt up on unit when BRAVO left last group of the day. All needs met.

## 2024-03-22 NOTE — CARE PLAN
Returned son's voice mail; he stated he visited his mother today and she was delusional about the other patient's: stated she thought a patient was him;  stated he does not think she is doing well; told him that there is a strong possibility she will be discharge tomorrow, as the Attending does not believe there is a medical reason for his mother to remain on an inpatient status;  son stated he is concerned she is still delusional and not well; told him would tell Treatment team and let him know tomorrow.  Sw to follow.

## 2024-03-22 NOTE — NURSING NOTE
"Pt interview in the [facility]  Pt is calm and cooperative  Pt stated her goal \"get sleep\"    her strength \"I like to color\"  and her coping skill \" I deep breath\"  Pt rated her anxiety 2-3/10  depression 2-3/10 and denied everything else at this time  Pt is appropriate with her answers to the questions at this time   "

## 2024-03-22 NOTE — DISCHARGE INSTR - APPOINTMENTS
Intake Assessment Appointment: for psychiatry, counseling and case management services    On Wednesday, March 27th, 2024 at 9:30 PM.    [facility] Office,  [address] [place] [address].  [place], [place], [address]    P. [phone];  F. [phone]

## 2024-03-22 NOTE — GROUP NOTE
Group Topic: Dialectical Behavioral Therapy - Emotional Regulation   Group Date: 3/21/2024  Start Time: 1600  End Time: 1650  Facilitators: MICHELET Resendez; MICHELET Rodriguez   Department: [facility] REHAB THERAPY VIRTUAL    Number of Participants: 6   Group Focus: coping skills and other symptom education  Treatment Modality: Recreation Therapy  Interventions utilized were exploration, mental fitness, and patient education  Purpose: coping skills and feelings        Name: Robert Momin YOB: 1975   MR: [id number]      Facilitator: Recreational Therapist  Level of Participation: active  Quality of Participation: appropriate/pleasant and cooperative  Interactions with others: appropriate and gave feedback  Mood/Affect: anxious and appropriate  Triggers (if applicable): N/A  Cognition: coherent/clear  Progress: Moderate  Comments: Session included education on the DBT skills related to Emotion Regulation and managing emotional suffering. The group completed an activity looking at imagery that evoked emotional feelings/thinking. Participants were then provided education and skills to better manage intense emotions. We utilized a handout, pictures, and the maker board to complete this session.     Patient attended the entire session. She was attentive, slightly anxious, and participative. Patient was willing to share her thoughts and ideas. Patient appeared to fully understand the topics and education being discussed. She complete all desired session tasks.     Plan: continue with services

## 2024-03-22 NOTE — NURSING NOTE
"The pt was calm and cooperative during the interview that took place in the main [facility] per the pt's request. The pt denies anxiety and depression rating them 0/10, denies SI and HI. For AVH the pt stated, \"You know, just my usual voice.\" The pt denies pain rating it a 0/10. Last bowel movement was, \"yesterday\" 3/21/24. Coping skills, \"Coloring and music.\" Goal for the day, \"Going home.\" Pt strength, \"I'm a good friend.\" The pt was medication compliant with her morning medications. Q 15 minute monitoring continued.   "

## 2024-03-22 NOTE — SIGNIFICANT EVENT
P[facility] has been approved; Contacted all the facilities through [facility] and told them the same; asked about acceptance; no firm accepting facility yet;  Met with patient and told her the same;  Will check in with [facility], [facility], [facility] and [facility] tomorrow as they all stated they ware still reviewing and will get back to this  asap on [facility].  Plan to discharge to skilled nursing asap after we have an accepting facility and they have the Precert approved through [place].   to follow.

## 2024-03-25 NOTE — SIGNIFICANT EVENT
Follow Up Phone Call    Outgoing phone call    Spoke to: Robert Momin Relationship:self   Phone number: [phone]      Outcome: I left a message on answering machine   No chief complaint on file.         Diagnosis:Not applicable

## 2024-03-26 NOTE — SIGNIFICANT EVENT
Follow Up Phone Call    Outgoing phone call    Spoke to: Robert Momin Relationship:self   Phone number: [phone]      Outcome: contacted patient/ family   No chief complaint on file.         Diagnosis:Not applicable    States she is feeling better. Denies suicidal ideation at this time. Has emergency contact numbers on his person. Encouraged compliance with medications and appointments. Voiced understanding. No further questions or concerns.